# Patient Record
Sex: MALE | Race: WHITE | Employment: UNEMPLOYED | URBAN - METROPOLITAN AREA
[De-identification: names, ages, dates, MRNs, and addresses within clinical notes are randomized per-mention and may not be internally consistent; named-entity substitution may affect disease eponyms.]

---

## 2021-11-01 ENCOUNTER — HOSPITAL ENCOUNTER (EMERGENCY)
Age: 41
Discharge: HOME | End: 2021-11-01
Attending: EMERGENCY MEDICINE
Payer: COMMERCIAL

## 2021-11-01 ENCOUNTER — APPOINTMENT (OUTPATIENT)
Dept: GENERAL RADIOLOGY | Age: 41
End: 2021-11-01
Payer: COMMERCIAL

## 2021-11-01 VITALS
DIASTOLIC BLOOD PRESSURE: 121 MMHG | OXYGEN SATURATION: 99 % | WEIGHT: 314 LBS | SYSTOLIC BLOOD PRESSURE: 185 MMHG | TEMPERATURE: 98.5 F | RESPIRATION RATE: 16 BRPM | HEART RATE: 90 BPM

## 2021-11-01 DIAGNOSIS — K92.2 GASTROINTESTINAL HEMORRHAGE, UNSPECIFIED GASTROINTESTINAL HEMORRHAGE TYPE: Primary | ICD-10-CM

## 2021-11-01 DIAGNOSIS — R03.0 ELEVATED BLOOD PRESSURE READING: ICD-10-CM

## 2021-11-01 DIAGNOSIS — E11.65 TYPE 2 DIABETES MELLITUS WITH HYPERGLYCEMIA, WITHOUT LONG-TERM CURRENT USE OF INSULIN (HCC): ICD-10-CM

## 2021-11-01 LAB
A/G RATIO: 1.4 (ref 1.1–2.2)
ABO/RH: NORMAL
ALBUMIN SERPL-MCNC: 4.5 G/DL (ref 3.4–5)
ALP BLD-CCNC: 146 U/L (ref 40–129)
ALT SERPL-CCNC: 38 U/L (ref 10–40)
ANION GAP SERPL CALCULATED.3IONS-SCNC: 13 MMOL/L (ref 3–16)
ANTIBODY SCREEN: NORMAL
AST SERPL-CCNC: 32 U/L (ref 15–37)
BASOPHILS ABSOLUTE: 0.1 K/UL (ref 0–0.2)
BASOPHILS RELATIVE PERCENT: 1.4 %
BILIRUB SERPL-MCNC: 0.8 MG/DL (ref 0–1)
BUN BLDV-MCNC: 13 MG/DL (ref 7–20)
CALCIUM SERPL-MCNC: 9.5 MG/DL (ref 8.3–10.6)
CHLORIDE BLD-SCNC: 95 MMOL/L (ref 99–110)
CO2: 33 MMOL/L (ref 21–32)
CREAT SERPL-MCNC: 1 MG/DL (ref 0.9–1.3)
EOSINOPHILS ABSOLUTE: 0.3 K/UL (ref 0–0.6)
EOSINOPHILS RELATIVE PERCENT: 2.7 %
GFR AFRICAN AMERICAN: >60
GFR NON-AFRICAN AMERICAN: >60
GLUCOSE BLD-MCNC: 262 MG/DL (ref 70–99)
HCT VFR BLD CALC: 41.3 % (ref 40.5–52.5)
HEMOGLOBIN: 14.7 G/DL (ref 13.5–17.5)
LIPASE: 57 U/L (ref 13–60)
LYMPHOCYTES ABSOLUTE: 1.5 K/UL (ref 1–5.1)
LYMPHOCYTES RELATIVE PERCENT: 14.7 %
MCH RBC QN AUTO: 28.7 PG (ref 26–34)
MCHC RBC AUTO-ENTMCNC: 35.6 G/DL (ref 31–36)
MCV RBC AUTO: 80.6 FL (ref 80–100)
MONOCYTES ABSOLUTE: 0.7 K/UL (ref 0–1.3)
MONOCYTES RELATIVE PERCENT: 6.6 %
NEUTROPHILS ABSOLUTE: 7.8 K/UL (ref 1.7–7.7)
NEUTROPHILS RELATIVE PERCENT: 74.6 %
OCCULT BLOOD DIAGNOSTIC: ABNORMAL
PDW BLD-RTO: 14 % (ref 12.4–15.4)
PLATELET # BLD: 195 K/UL (ref 135–450)
PMV BLD AUTO: 8.4 FL (ref 5–10.5)
POTASSIUM SERPL-SCNC: 3.3 MMOL/L (ref 3.5–5.1)
PRO-BNP: 100 PG/ML (ref 0–124)
RBC # BLD: 5.13 M/UL (ref 4.2–5.9)
SODIUM BLD-SCNC: 141 MMOL/L (ref 136–145)
TOTAL PROTEIN: 7.8 G/DL (ref 6.4–8.2)
TROPONIN: <0.01 NG/ML
WBC # BLD: 10.4 K/UL (ref 4–11)

## 2021-11-01 PROCEDURE — 80053 COMPREHEN METABOLIC PANEL: CPT

## 2021-11-01 PROCEDURE — 99283 EMERGENCY DEPT VISIT LOW MDM: CPT

## 2021-11-01 PROCEDURE — 86850 RBC ANTIBODY SCREEN: CPT

## 2021-11-01 PROCEDURE — 83880 ASSAY OF NATRIURETIC PEPTIDE: CPT

## 2021-11-01 PROCEDURE — 83690 ASSAY OF LIPASE: CPT

## 2021-11-01 PROCEDURE — 93005 ELECTROCARDIOGRAM TRACING: CPT | Performed by: EMERGENCY MEDICINE

## 2021-11-01 PROCEDURE — 84484 ASSAY OF TROPONIN QUANT: CPT

## 2021-11-01 PROCEDURE — 86901 BLOOD TYPING SEROLOGIC RH(D): CPT

## 2021-11-01 PROCEDURE — 85025 COMPLETE CBC W/AUTO DIFF WBC: CPT

## 2021-11-01 PROCEDURE — 71045 X-RAY EXAM CHEST 1 VIEW: CPT

## 2021-11-01 PROCEDURE — 73030 X-RAY EXAM OF SHOULDER: CPT

## 2021-11-01 PROCEDURE — 36415 COLL VENOUS BLD VENIPUNCTURE: CPT

## 2021-11-01 PROCEDURE — 82270 OCCULT BLOOD FECES: CPT

## 2021-11-01 PROCEDURE — 86900 BLOOD TYPING SEROLOGIC ABO: CPT

## 2021-11-01 RX ORDER — ASPIRIN 81 MG/1
81 TABLET, CHEWABLE ORAL DAILY
COMMUNITY

## 2021-11-01 RX ORDER — LISINOPRIL AND HYDROCHLOROTHIAZIDE 25; 20 MG/1; MG/1
1 TABLET ORAL DAILY
Qty: 30 TABLET | Refills: 0 | Status: SHIPPED | OUTPATIENT
Start: 2021-11-01

## 2021-11-01 ASSESSMENT — ENCOUNTER SYMPTOMS
VOMITING: 0
DIARRHEA: 0
CHEST TIGHTNESS: 0
NAUSEA: 0
ABDOMINAL PAIN: 0
BLOOD IN STOOL: 1
SHORTNESS OF BREATH: 0

## 2021-11-01 NOTE — ED PROVIDER NOTES
[facility]        Pt Name: Laura Steele  MRN: [id number]  [place] 1980  Date of evaluation: 11/1/2021  Provider: KIMBER Morrell CNP  PCP: No primary care provider on file. Note Started: 3:10 PM EDT        I have seen and evaluated this patient with my supervising physician Geena Christianson MD.    [address] [place] [address]       Chief Complaint   Patient presents with    Rectal Bleeding     Patient in with complaints of dark red rectal bleeding that started around 0100. Denies abd pain at this time. States months ago he recalls having abd pain but none at this time        HISTORY OF PRESENT ILLNESS   (Location, Timing/Onset, Context/Setting, Quality, Duration, Modifying Factors, Severity, Associated Signs and Symptoms)  Note limiting factors. Chief Complaint: multiple complaints     Laura Steele is a 41 y.o. male who presents to the emergency department with complaint of rectal bleeding. Onset of symptoms at about 1:30 AM and second episode occurring at 1:30 PM.  The patient reports that rectal discharge is clear mixed with clots. No anal pain or concern/history of hemorrhoids. No abdominal pain. Patient denies history of rectal bleeding or GI bleeding. States that he has been taking aspirin for a left shoulder injury as well as some naproxen/ibuprofen but feels like he is not taking too much, reports he is trying to avoid this medication. Also noted to have high blood pressure today with blood pressure of 215/124, states that he is not taking any blood pressure medication for several months as he did not have a good relationship with his doctor and he moved to the [facility] from [place], has not established primary care. Denies chest pain or shortness of breath. Denies any headache, fever, lightheadedness, dizziness, visual disturbances. No chest pain or pressure. No neck or back pain.   No shortness of breath, cough, or congestion. No abdominal pain, nausea, vomiting, diarrhea, constipation, or dysuria. No rash. Nursing Notes were all reviewed and agreed with or any disagreements were addressed in the HPI. REVIEW OF SYSTEMS    (2-9 systems for level 4, 10 or more for level 5)     Review of Systems   Constitutional: Negative for activity change, chills and fever. Respiratory: Negative for chest tightness and shortness of breath. Cardiovascular: Negative for chest pain. High blood pressure   Gastrointestinal: Positive for blood in stool. Negative for abdominal pain, diarrhea, nausea and vomiting. Genitourinary: Negative for dysuria. Musculoskeletal:        Left shoulder injury/pain   All other systems reviewed and are negative. Positives and Pertinent negatives as per HPI. Except as noted above in the ROS, all other systems were reviewed and negative. PAST MEDICAL HISTORY   No past medical history on file. SURGICAL HISTORY   No past surgical history on file. CURRENTMEDICATIONS       Previous Medications    ASPIRIN 81 MG CHEWABLE TABLET    Take 81 mg by mouth daily         ALLERGIES     Patient has no known allergies. FAMILYHISTORY     No family history on file. SOCIAL HISTORY       Social History     Tobacco Use    Smoking status: Never Smoker    Smokeless tobacco: Never Used   Substance Use Topics    Alcohol use: Not Currently    Drug use: Never       SCREENINGS             PHYSICAL EXAM    (up to 7 for level 4, 8 or more for level 5)     ED Triage Vitals [11/01/21 1441]   BP Temp Temp Source Pulse Resp SpO2 Height Weight   (!) 215/124 99.4 °F (37.4 °C) Oral 102 20 95 % -- (!) 314 lb (142.4 kg)       Physical Exam  Vitals and nursing note reviewed. Constitutional:       Appearance: He is well-developed. He is not diaphoretic. HENT:      Head: Normocephalic and atraumatic.       Right Ear: External ear normal.      Left Ear: External ear normal.   Eyes: General:         Right eye: No discharge. Left eye: No discharge. Neck:      Vascular: No JVD. Cardiovascular:      Rate and Rhythm: Tachycardia present. Pulses: Normal pulses. Heart sounds: Normal heart sounds. Pulmonary:      Effort: Pulmonary effort is normal. No respiratory distress. Breath sounds: Normal breath sounds. Abdominal:      Palpations: Abdomen is soft. Tenderness: There is no abdominal tenderness. Musculoskeletal:         General: Normal range of motion. Cervical back: Normal range of motion and neck supple. Skin:     General: Skin is warm and dry. Coloration: Skin is not pale. Neurological:      Mental Status: He is alert and oriented to person, place, and time.    Psychiatric:         Behavior: Behavior normal.     rectal exam reveals bright red blood per rectum    DIAGNOSTIC RESULTS   LABS:    Labs Reviewed   CBC WITH AUTO DIFFERENTIAL - Abnormal; Notable for the following components:       Result Value    Neutrophils Absolute 7.8 (*)     All other components within normal limits    Narrative:     Performed at:  [facility] [address] [place]   Phone [phone]   COMPREHENSIVE METABOLIC PANEL - Abnormal; Notable for the following components:    Potassium 3.3 (*)     Chloride 95 (*)     CO2 33 (*)     Glucose 262 (*)     Alkaline Phosphatase 146 (*)     All other components within normal limits    Narrative:     Performed at:  [facility]  [address],  [place], [address]   Phone [phone]   LIPASE    Narrative:     Performed at:  [facility] [address],  [place], [address]   Phone [phone]   TROPONIN    Narrative:     Performed at:  [facility] [address],  [place], [address]   Phone [phone]   BRAIN NATRIURETIC PEPTIDE    Narrative:     Performed at:  [facility]  [address] [place], [place]   Phone [phone]   BLOOD OCCULT STOOL DIAGNOSTIC   TYPE AND SCREEN    Narrative:     Performed at:  [facility]  [address] [place], [address]   Phone [phone]       When ordered only abnormal lab results are displayed. All other labs were within normal range or not returned as of this dictation. EKG: When ordered, EKG's are interpreted by the Emergency Department Physician in the absence of a cardiologist.  Please see their note for interpretation of EKG. RADIOLOGY:   Non-plain film images such as CT, Ultrasound and MRI are read by the radiologist. Plain radiographic images are visualized and preliminarily interpreted by the ED Provider with the below findings:        Interpretation per the Radiologist below, if available at the time of this note:    XR CHEST PORTABLE   Final Result   No active cardiopulmonary disease         XR SHOULDER LEFT (MIN 2 VIEWS)   Final Result   No acute abnormality           No results found. PROCEDURES   Unless otherwise noted below, none     Procedures    CRITICAL CARE TIME   N/A    CONSULTS:  None      EMERGENCY DEPARTMENT COURSE and DIFFERENTIAL DIAGNOSIS/MDM:   Vitals:    Vitals:    11/01/21 1441 11/01/21 1603   BP: (!) 215/124 (!) 209/112   Pulse: 102 92   Resp: 20 16   Temp: 99.4 °F (37.4 °C)    TempSrc: Oral    SpO2: 95%    Weight: (!) 314 lb (142.4 kg)        Patient was given the following medications:  Medications - No data to display        Briefly, this is a 42-year-old male who presents to the emergency department today with multiple complaints. It is noted that he is extremely elevated blood pressure with a pressure of 215/124. States that he is noncompliant with medications and no longer takes his blood pressure medication. States that he moved to the [facility] from [place] and has not established primary care.   He has no idea of medication that he used to be on, states that he does not recall. He is also complaining of pain to the left shoulder, injury greater than 10weeks old. Also complaining of    Passing clots the rectum since overnight. CBC is unremarkable. CMP does show a hypokalemia potassium 3.3 and diabetes with a glucose of 262. Patient reports that he does note that he is a diabetic and he is managing his diabetes with \"self-awareness and dietary changes\". I will start this patient on Metformin today and instruct him on dietary changes that may lead to some weight loss. He also needs to follow-up with primary care. Lipase is normal.  Troponin negative. . XR CHEST PORTABLE (Final result)  Result time 11/01/21 15:51:49  Final result by Sravani Buck MD (11/01/21 15:51:49)                Impression:    No active cardiopulmonary disease               XR SHOULDER LEFT (MIN 2 VIEWS) (Final result)  Result time 11/01/21 15:34:24  Final result by Sravani Buck MD (11/01/21 15:34:24)                Impression:    No acute abnormality               Follow-up with primary care, GI referral provided. Patient is instructed on strict return precautions close outpatient follow-up. He does verbalize understanding    We will start him on lisinopril with hydrochlorothiazide for his blood pressure. Instructed to return for chest pain or shortness of breath. FINAL IMPRESSION      1. Gastrointestinal hemorrhage, unspecified gastrointestinal hemorrhage type    2. Elevated blood pressure reading    3.  Type 2 diabetes mellitus with hyperglycemia, without long-term current use of insulin [facility])          DISPOSITION/PLAN   DISPOSITION Decision To Discharge 11/01/2021 04:56:54 PM      PATIENT REFERRED TO:  Milad Morales  [phone]  Schedule an appointment as soon as possible for a visit       Robert Persaud MD  [address]  [place]  [phone]    Schedule an appointment

## 2021-11-01 NOTE — ED PROVIDER NOTES
I independently performed a history and physical on Jacinda Leal. All diagnostic, treatment, and disposition decisions were made by myself in conjunction with the advanced practice provider. Briefly, this is a 41 y.o. male here for 3 separate complaints. #1 is of left shoulder pain, #2 is hypertension, and #3 rectal bleeding. 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 No tingling numbness weeks paresthesia no fevers chills sweats but  On examination he has a full range of motion, is neurovascular intact there is no signs of impingement and minimal tenderness to palpation left shoulder joint. #2) As to the hypertension has history hypertension has not been on medicines for at least a few months he is not sure what he is to take but he feels he is hypertensive now. No chest pain or dyspnea. No nausea or diarrhea. Heart rhythm regular lungs are clear. Grade 1 AV nicking on funduscopy. #3) rectal bleeding states 1 days had a few episodes of bright red blood per rectum. No abdominal pain no fevers chills sweats no history of prior GI bleeds, no anticoagulation. .          EKG  EKG is reviewed by myself. Dated today 1540. Rate 91 sinus rhythm LVH pattern. No prior      Screenings                   MDM  #1, left shoulder. Full  Range of motion no indication for imaging. 2.  Hypertension and is to start on thiazides probably in combination with ACE inhibitor and be referred to cardiology. We will check labs and EKG. 3.  GI bleeding abdomen is benign most likely hemorrhoidal versus fissure. Will do examination. Check labs. If benign can get referred to GI for scope. 4) Noted to be hyperglycemic on labs; Start metformin, refer to PCP.     Patient Referrals:  Milad Morales  [phone]  Schedule an appointment as soon as possible for a visit       Claudeen Gros Marilynn Lemons, [address] [facility]  [phone]    Schedule an appointment as soon as possible for a visit         Discharge Medications:  New Prescriptions    LISINOPRIL-HYDROCHLOROTHIAZIDE (PRINZIDE;ZESTORETIC) 20-25 MG PER TABLET    Take 1 tablet by mouth daily    METFORMIN (GLUCOPHAGE) 500 MG TABLET    Take 1 tablet by mouth 2 times daily (with meals)       FINAL IMPRESSION  1. Gastrointestinal hemorrhage, unspecified gastrointestinal hemorrhage type    2. Elevated blood pressure reading    3. Type 2 diabetes mellitus with hyperglycemia, without long-term current use of insulin (HCC)        Blood pressure (!) 209/112, pulse 92, temperature 99.4 °F (37.4 °C), temperature source Oral, resp. rate 16, weight (!) 314 lb (142.4 kg), SpO2 95 %. For further details of [facility] emergency department encounter, please see documentation by advanced practice provider, Shaina Lopes.        Janeth Naidu MD  11/01/21 MD Brenden  11/01/21 3097

## 2021-11-02 LAB
EKG ATRIAL RATE: 91 BPM
EKG DIAGNOSIS: NORMAL
EKG P AXIS: 16 DEGREES
EKG P-R INTERVAL: 154 MS
EKG Q-T INTERVAL: 414 MS
EKG QRS DURATION: 94 MS
EKG QTC CALCULATION (BAZETT): 509 MS
EKG R AXIS: -28 DEGREES
EKG T AXIS: 31 DEGREES
EKG VENTRICULAR RATE: 91 BPM

## 2021-11-02 PROCEDURE — 93010 ELECTROCARDIOGRAM REPORT: CPT | Performed by: INTERNAL MEDICINE

## 2022-12-04 ENCOUNTER — APPOINTMENT (OUTPATIENT)
Dept: CT IMAGING | Age: 42
End: 2022-12-04
Payer: MEDICAID

## 2022-12-04 ENCOUNTER — HOSPITAL ENCOUNTER (INPATIENT)
Age: 42
LOS: 4 days | Discharge: LEFT BEFORE DISCHARGE | End: 2022-12-08
Attending: STUDENT IN AN ORGANIZED HEALTH CARE EDUCATION/TRAINING PROGRAM | Admitting: HOSPITALIST
Payer: MEDICAID

## 2022-12-04 DIAGNOSIS — I16.1 HYPERTENSIVE EMERGENCY: ICD-10-CM

## 2022-12-04 DIAGNOSIS — R04.2 HEMOPTYSIS: Primary | ICD-10-CM

## 2022-12-04 LAB
A/G RATIO: 1.5 (ref 1.1–2.2)
ALBUMIN SERPL-MCNC: 4.3 G/DL (ref 3.4–5)
ALP BLD-CCNC: 91 U/L (ref 40–129)
ALT SERPL-CCNC: 17 U/L (ref 10–40)
ANION GAP SERPL CALCULATED.3IONS-SCNC: 10 MMOL/L (ref 3–16)
AST SERPL-CCNC: 22 U/L (ref 15–37)
BASOPHILS ABSOLUTE: 0.1 K/UL (ref 0–0.2)
BASOPHILS RELATIVE PERCENT: 1.1 %
BILIRUB SERPL-MCNC: 0.6 MG/DL (ref 0–1)
BUN BLDV-MCNC: 20 MG/DL (ref 7–20)
CALCIUM SERPL-MCNC: 8.7 MG/DL (ref 8.3–10.6)
CHLORIDE BLD-SCNC: 103 MMOL/L (ref 99–110)
CO2: 27 MMOL/L (ref 21–32)
CREAT SERPL-MCNC: 0.9 MG/DL (ref 0.9–1.3)
EOSINOPHILS ABSOLUTE: 0.2 K/UL (ref 0–0.6)
EOSINOPHILS RELATIVE PERCENT: 2.1 %
GFR SERPL CREATININE-BSD FRML MDRD: >60 ML/MIN/{1.73_M2}
GLUCOSE BLD-MCNC: 113 MG/DL (ref 70–99)
HCT VFR BLD CALC: 42.9 % (ref 40.5–52.5)
HEMOGLOBIN: 15 G/DL (ref 13.5–17.5)
INFLUENZA A: NOT DETECTED
INFLUENZA B: NOT DETECTED
LYMPHOCYTES ABSOLUTE: 1.8 K/UL (ref 1–5.1)
LYMPHOCYTES RELATIVE PERCENT: 20.6 %
MAGNESIUM: 1.8 MG/DL (ref 1.8–2.4)
MCH RBC QN AUTO: 28.4 PG (ref 26–34)
MCHC RBC AUTO-ENTMCNC: 34.9 G/DL (ref 31–36)
MCV RBC AUTO: 81.3 FL (ref 80–100)
MONOCYTES ABSOLUTE: 0.7 K/UL (ref 0–1.3)
MONOCYTES RELATIVE PERCENT: 7.9 %
NEUTROPHILS ABSOLUTE: 6 K/UL (ref 1.7–7.7)
NEUTROPHILS RELATIVE PERCENT: 68.3 %
PDW BLD-RTO: 14.2 % (ref 12.4–15.4)
PLATELET # BLD: 220 K/UL (ref 135–450)
PMV BLD AUTO: 8.4 FL (ref 5–10.5)
POTASSIUM REFLEX MAGNESIUM: 3.3 MMOL/L (ref 3.5–5.1)
PRO-BNP: 202 PG/ML (ref 0–124)
RBC # BLD: 5.27 M/UL (ref 4.2–5.9)
SARS-COV-2 RNA, RT PCR: NOT DETECTED
SODIUM BLD-SCNC: 140 MMOL/L (ref 136–145)
TOTAL PROTEIN: 7.1 G/DL (ref 6.4–8.2)
TROPONIN: <0.01 NG/ML
WBC # BLD: 8.8 K/UL (ref 4–11)

## 2022-12-04 PROCEDURE — 85025 COMPLETE CBC W/AUTO DIFF WBC: CPT

## 2022-12-04 PROCEDURE — 83036 HEMOGLOBIN GLYCOSYLATED A1C: CPT

## 2022-12-04 PROCEDURE — 84484 ASSAY OF TROPONIN QUANT: CPT

## 2022-12-04 PROCEDURE — 87636 SARSCOV2 & INF A&B AMP PRB: CPT

## 2022-12-04 PROCEDURE — 80053 COMPREHEN METABOLIC PANEL: CPT

## 2022-12-04 PROCEDURE — 83735 ASSAY OF MAGNESIUM: CPT

## 2022-12-04 PROCEDURE — 6360000002 HC RX W HCPCS: Performed by: STUDENT IN AN ORGANIZED HEALTH CARE EDUCATION/TRAINING PROGRAM

## 2022-12-04 PROCEDURE — 99285 EMERGENCY DEPT VISIT HI MDM: CPT

## 2022-12-04 PROCEDURE — 84145 PROCALCITONIN (PCT): CPT

## 2022-12-04 PROCEDURE — 2500000003 HC RX 250 WO HCPCS: Performed by: STUDENT IN AN ORGANIZED HEALTH CARE EDUCATION/TRAINING PROGRAM

## 2022-12-04 PROCEDURE — 36415 COLL VENOUS BLD VENIPUNCTURE: CPT

## 2022-12-04 PROCEDURE — 71260 CT THORAX DX C+: CPT | Performed by: STUDENT IN AN ORGANIZED HEALTH CARE EDUCATION/TRAINING PROGRAM

## 2022-12-04 PROCEDURE — 96375 TX/PRO/DX INJ NEW DRUG ADDON: CPT

## 2022-12-04 PROCEDURE — 6360000004 HC RX CONTRAST MEDICATION: Performed by: STUDENT IN AN ORGANIZED HEALTH CARE EDUCATION/TRAINING PROGRAM

## 2022-12-04 PROCEDURE — 2060000000 HC ICU INTERMEDIATE R&B

## 2022-12-04 PROCEDURE — 2580000003 HC RX 258: Performed by: STUDENT IN AN ORGANIZED HEALTH CARE EDUCATION/TRAINING PROGRAM

## 2022-12-04 PROCEDURE — 96374 THER/PROPH/DIAG INJ IV PUSH: CPT

## 2022-12-04 PROCEDURE — 70450 CT HEAD/BRAIN W/O DYE: CPT

## 2022-12-04 PROCEDURE — 83880 ASSAY OF NATRIURETIC PEPTIDE: CPT

## 2022-12-04 RX ORDER — INSULIN LISPRO 100 [IU]/ML
0-4 INJECTION, SOLUTION INTRAVENOUS; SUBCUTANEOUS NIGHTLY
Status: DISCONTINUED | OUTPATIENT
Start: 2022-12-04 | End: 2022-12-07

## 2022-12-04 RX ORDER — LABETALOL HYDROCHLORIDE 5 MG/ML
10 INJECTION, SOLUTION INTRAVENOUS ONCE
Status: COMPLETED | OUTPATIENT
Start: 2022-12-04 | End: 2022-12-04

## 2022-12-04 RX ORDER — ONDANSETRON 4 MG/1
4 TABLET, ORALLY DISINTEGRATING ORAL EVERY 8 HOURS PRN
Status: DISCONTINUED | OUTPATIENT
Start: 2022-12-04 | End: 2022-12-08 | Stop reason: HOSPADM

## 2022-12-04 RX ORDER — ASPIRIN 81 MG/1
81 TABLET, CHEWABLE ORAL DAILY
Status: DISCONTINUED | OUTPATIENT
Start: 2022-12-05 | End: 2022-12-08 | Stop reason: HOSPADM

## 2022-12-04 RX ORDER — LISINOPRIL AND HYDROCHLOROTHIAZIDE 25; 20 MG/1; MG/1
1 TABLET ORAL DAILY
Status: DISCONTINUED | OUTPATIENT
Start: 2022-12-05 | End: 2022-12-04

## 2022-12-04 RX ORDER — HYDRALAZINE HYDROCHLORIDE 20 MG/ML
5 INJECTION INTRAMUSCULAR; INTRAVENOUS ONCE
Status: COMPLETED | OUTPATIENT
Start: 2022-12-04 | End: 2022-12-04

## 2022-12-04 RX ORDER — POLYETHYLENE GLYCOL 3350 17 G/17G
17 POWDER, FOR SOLUTION ORAL DAILY PRN
Status: DISCONTINUED | OUTPATIENT
Start: 2022-12-04 | End: 2022-12-08 | Stop reason: HOSPADM

## 2022-12-04 RX ORDER — ACETAMINOPHEN 650 MG/1
650 SUPPOSITORY RECTAL EVERY 6 HOURS PRN
Status: DISCONTINUED | OUTPATIENT
Start: 2022-12-04 | End: 2022-12-08 | Stop reason: HOSPADM

## 2022-12-04 RX ORDER — SODIUM CHLORIDE 0.9 % (FLUSH) 0.9 %
5-40 SYRINGE (ML) INJECTION EVERY 12 HOURS SCHEDULED
Status: DISCONTINUED | OUTPATIENT
Start: 2022-12-04 | End: 2022-12-08 | Stop reason: HOSPADM

## 2022-12-04 RX ORDER — SODIUM CHLORIDE 9 MG/ML
INJECTION, SOLUTION INTRAVENOUS PRN
Status: DISCONTINUED | OUTPATIENT
Start: 2022-12-04 | End: 2022-12-08 | Stop reason: HOSPADM

## 2022-12-04 RX ORDER — ACETAMINOPHEN 325 MG/1
650 TABLET ORAL EVERY 6 HOURS PRN
Status: DISCONTINUED | OUTPATIENT
Start: 2022-12-04 | End: 2022-12-08 | Stop reason: HOSPADM

## 2022-12-04 RX ORDER — HYDRALAZINE HYDROCHLORIDE 20 MG/ML
10 INJECTION INTRAMUSCULAR; INTRAVENOUS EVERY 6 HOURS PRN
Status: DISCONTINUED | OUTPATIENT
Start: 2022-12-04 | End: 2022-12-08

## 2022-12-04 RX ORDER — SODIUM CHLORIDE 0.9 % (FLUSH) 0.9 %
5-40 SYRINGE (ML) INJECTION PRN
Status: DISCONTINUED | OUTPATIENT
Start: 2022-12-04 | End: 2022-12-08 | Stop reason: HOSPADM

## 2022-12-04 RX ORDER — HYDROCHLOROTHIAZIDE 25 MG/1
25 TABLET ORAL DAILY
Status: DISCONTINUED | OUTPATIENT
Start: 2022-12-05 | End: 2022-12-05

## 2022-12-04 RX ORDER — INSULIN LISPRO 100 [IU]/ML
0-4 INJECTION, SOLUTION INTRAVENOUS; SUBCUTANEOUS
Status: DISCONTINUED | OUTPATIENT
Start: 2022-12-05 | End: 2022-12-07

## 2022-12-04 RX ORDER — LISINOPRIL 20 MG/1
20 TABLET ORAL DAILY
Status: DISCONTINUED | OUTPATIENT
Start: 2022-12-05 | End: 2022-12-05

## 2022-12-04 RX ORDER — ONDANSETRON 2 MG/ML
4 INJECTION INTRAMUSCULAR; INTRAVENOUS EVERY 6 HOURS PRN
Status: DISCONTINUED | OUTPATIENT
Start: 2022-12-04 | End: 2022-12-08 | Stop reason: HOSPADM

## 2022-12-04 RX ORDER — DEXTROSE MONOHYDRATE 100 MG/ML
INJECTION, SOLUTION INTRAVENOUS CONTINUOUS PRN
Status: DISCONTINUED | OUTPATIENT
Start: 2022-12-04 | End: 2022-12-08 | Stop reason: HOSPADM

## 2022-12-04 RX ADMIN — CEFTRIAXONE SODIUM 1000 MG: 1 INJECTION, POWDER, FOR SOLUTION INTRAMUSCULAR; INTRAVENOUS at 23:20

## 2022-12-04 RX ADMIN — IOPAMIDOL 85 ML: 755 INJECTION, SOLUTION INTRAVENOUS at 22:04

## 2022-12-04 RX ADMIN — HYDRALAZINE HYDROCHLORIDE 5 MG: 20 INJECTION INTRAMUSCULAR; INTRAVENOUS at 21:16

## 2022-12-04 RX ADMIN — LABETALOL HYDROCHLORIDE 10 MG: 5 INJECTION, SOLUTION INTRAVENOUS at 22:45

## 2022-12-04 ASSESSMENT — LIFESTYLE VARIABLES
HOW OFTEN DO YOU HAVE A DRINK CONTAINING ALCOHOL: NEVER
HOW MANY STANDARD DRINKS CONTAINING ALCOHOL DO YOU HAVE ON A TYPICAL DAY: PATIENT DOES NOT DRINK

## 2022-12-04 ASSESSMENT — PAIN - FUNCTIONAL ASSESSMENT: PAIN_FUNCTIONAL_ASSESSMENT: 0-10

## 2022-12-04 ASSESSMENT — PAIN SCALES - GENERAL: PAINLEVEL_OUTOF10: 0

## 2022-12-05 PROBLEM — R07.9 CHEST PAIN: Status: ACTIVE | Noted: 2022-12-05

## 2022-12-05 PROBLEM — J81.0 ACUTE PULMONARY EDEMA (HCC): Status: ACTIVE | Noted: 2022-12-05

## 2022-12-05 PROBLEM — I16.1 HYPERTENSIVE EMERGENCY: Status: ACTIVE | Noted: 2022-12-05

## 2022-12-05 LAB
ALBUMIN SERPL-MCNC: 4.4 G/DL (ref 3.4–5)
ANION GAP SERPL CALCULATED.3IONS-SCNC: 10 MMOL/L (ref 3–16)
ANION GAP SERPL CALCULATED.3IONS-SCNC: 9 MMOL/L (ref 3–16)
BASOPHILS ABSOLUTE: 0.1 K/UL (ref 0–0.2)
BASOPHILS RELATIVE PERCENT: 1.2 %
BUN BLDV-MCNC: 15 MG/DL (ref 7–20)
BUN BLDV-MCNC: 15 MG/DL (ref 7–20)
C-REACTIVE PROTEIN: 5.8 MG/L (ref 0–5.1)
CALCIUM SERPL-MCNC: 9.1 MG/DL (ref 8.3–10.6)
CALCIUM SERPL-MCNC: 9.7 MG/DL (ref 8.3–10.6)
CHLORIDE BLD-SCNC: 100 MMOL/L (ref 99–110)
CHLORIDE BLD-SCNC: 101 MMOL/L (ref 99–110)
CHOLESTEROL, TOTAL: 183 MG/DL (ref 0–199)
CO2: 28 MMOL/L (ref 21–32)
CO2: 29 MMOL/L (ref 21–32)
CREAT SERPL-MCNC: 0.9 MG/DL (ref 0.9–1.3)
CREAT SERPL-MCNC: 1 MG/DL (ref 0.9–1.3)
EOSINOPHILS ABSOLUTE: 0.2 K/UL (ref 0–0.6)
EOSINOPHILS RELATIVE PERCENT: 2.1 %
ESTIMATED AVERAGE GLUCOSE: 114 MG/DL
GFR SERPL CREATININE-BSD FRML MDRD: >60 ML/MIN/{1.73_M2}
GFR SERPL CREATININE-BSD FRML MDRD: >60 ML/MIN/{1.73_M2}
GLUCOSE BLD-MCNC: 113 MG/DL (ref 70–99)
GLUCOSE BLD-MCNC: 115 MG/DL (ref 70–99)
GLUCOSE BLD-MCNC: 119 MG/DL (ref 70–99)
GLUCOSE BLD-MCNC: 168 MG/DL (ref 70–99)
GLUCOSE BLD-MCNC: 201 MG/DL (ref 70–99)
HBA1C MFR BLD: 5.6 %
HCT VFR BLD CALC: 42.9 % (ref 40.5–52.5)
HDLC SERPL-MCNC: 29 MG/DL (ref 40–60)
HEMOGLOBIN: 14.8 G/DL (ref 13.5–17.5)
LDL CHOLESTEROL CALCULATED: 123 MG/DL
LYMPHOCYTES ABSOLUTE: 1.3 K/UL (ref 1–5.1)
LYMPHOCYTES RELATIVE PERCENT: 17.8 %
MAGNESIUM: 1.8 MG/DL (ref 1.8–2.4)
MCH RBC QN AUTO: 28.2 PG (ref 26–34)
MCHC RBC AUTO-ENTMCNC: 34.5 G/DL (ref 31–36)
MCV RBC AUTO: 81.6 FL (ref 80–100)
MONOCYTES ABSOLUTE: 0.5 K/UL (ref 0–1.3)
MONOCYTES RELATIVE PERCENT: 7.5 %
NEUTROPHILS ABSOLUTE: 5.2 K/UL (ref 1.7–7.7)
NEUTROPHILS RELATIVE PERCENT: 71.4 %
PDW BLD-RTO: 14.2 % (ref 12.4–15.4)
PERFORMED ON: ABNORMAL
PHOSPHORUS: 2.6 MG/DL (ref 2.5–4.9)
PLATELET # BLD: 210 K/UL (ref 135–450)
PMV BLD AUTO: 9.2 FL (ref 5–10.5)
POTASSIUM REFLEX MAGNESIUM: 2.8 MMOL/L (ref 3.5–5.1)
POTASSIUM SERPL-SCNC: 3.3 MMOL/L (ref 3.5–5.1)
PROCALCITONIN: 0.09 NG/ML (ref 0–0.15)
RBC # BLD: 5.26 M/UL (ref 4.2–5.9)
SODIUM BLD-SCNC: 137 MMOL/L (ref 136–145)
SODIUM BLD-SCNC: 140 MMOL/L (ref 136–145)
TRIGL SERPL-MCNC: 157 MG/DL (ref 0–150)
TROPONIN: <0.01 NG/ML
TROPONIN: <0.01 NG/ML
VLDLC SERPL CALC-MCNC: 31 MG/DL
WBC # BLD: 7.3 K/UL (ref 4–11)

## 2022-12-05 PROCEDURE — 99291 CRITICAL CARE FIRST HOUR: CPT | Performed by: INTERNAL MEDICINE

## 2022-12-05 PROCEDURE — 2500000003 HC RX 250 WO HCPCS: Performed by: HOSPITALIST

## 2022-12-05 PROCEDURE — 6360000002 HC RX W HCPCS: Performed by: HOSPITALIST

## 2022-12-05 PROCEDURE — 83735 ASSAY OF MAGNESIUM: CPT

## 2022-12-05 PROCEDURE — 84484 ASSAY OF TROPONIN QUANT: CPT

## 2022-12-05 PROCEDURE — 2580000003 HC RX 258: Performed by: INTERNAL MEDICINE

## 2022-12-05 PROCEDURE — 80061 LIPID PANEL: CPT

## 2022-12-05 PROCEDURE — 6360000002 HC RX W HCPCS: Performed by: INTERNAL MEDICINE

## 2022-12-05 PROCEDURE — 2580000003 HC RX 258: Performed by: HOSPITALIST

## 2022-12-05 PROCEDURE — 36415 COLL VENOUS BLD VENIPUNCTURE: CPT

## 2022-12-05 PROCEDURE — 85025 COMPLETE CBC W/AUTO DIFF WBC: CPT

## 2022-12-05 PROCEDURE — 80069 RENAL FUNCTION PANEL: CPT

## 2022-12-05 PROCEDURE — 6370000000 HC RX 637 (ALT 250 FOR IP): Performed by: HOSPITALIST

## 2022-12-05 PROCEDURE — 93306 TTE W/DOPPLER COMPLETE: CPT

## 2022-12-05 PROCEDURE — 6370000000 HC RX 637 (ALT 250 FOR IP): Performed by: INTERNAL MEDICINE

## 2022-12-05 PROCEDURE — 2500000003 HC RX 250 WO HCPCS: Performed by: INTERNAL MEDICINE

## 2022-12-05 PROCEDURE — 2000000000 HC ICU R&B

## 2022-12-05 PROCEDURE — 6360000002 HC RX W HCPCS

## 2022-12-05 PROCEDURE — 86140 C-REACTIVE PROTEIN: CPT

## 2022-12-05 PROCEDURE — 93005 ELECTROCARDIOGRAM TRACING: CPT

## 2022-12-05 RX ORDER — LABETALOL HYDROCHLORIDE 5 MG/ML
20 INJECTION, SOLUTION INTRAVENOUS EVERY 4 HOURS PRN
Status: DISCONTINUED | OUTPATIENT
Start: 2022-12-05 | End: 2022-12-08

## 2022-12-05 RX ORDER — METOPROLOL TARTRATE 50 MG/1
50 TABLET, FILM COATED ORAL 2 TIMES DAILY
Status: DISCONTINUED | OUTPATIENT
Start: 2022-12-05 | End: 2022-12-08 | Stop reason: HOSPADM

## 2022-12-05 RX ORDER — POTASSIUM CHLORIDE 7.45 MG/ML
10 INJECTION INTRAVENOUS PRN
Status: DISCONTINUED | OUTPATIENT
Start: 2022-12-05 | End: 2022-12-05 | Stop reason: DRUGHIGH

## 2022-12-05 RX ORDER — MAGNESIUM SULFATE IN WATER 40 MG/ML
2000 INJECTION, SOLUTION INTRAVENOUS PRN
Status: DISCONTINUED | OUTPATIENT
Start: 2022-12-05 | End: 2022-12-08 | Stop reason: HOSPADM

## 2022-12-05 RX ORDER — POTASSIUM CHLORIDE 29.8 MG/ML
20 INJECTION INTRAVENOUS PRN
Status: DISCONTINUED | OUTPATIENT
Start: 2022-12-05 | End: 2022-12-08 | Stop reason: HOSPADM

## 2022-12-05 RX ORDER — AMLODIPINE BESYLATE 5 MG/1
10 TABLET ORAL NIGHTLY
Status: DISCONTINUED | OUTPATIENT
Start: 2022-12-05 | End: 2022-12-05

## 2022-12-05 RX ORDER — POTASSIUM CHLORIDE 7.45 MG/ML
10 INJECTION INTRAVENOUS PRN
Status: DISCONTINUED | OUTPATIENT
Start: 2022-12-05 | End: 2022-12-08 | Stop reason: HOSPADM

## 2022-12-05 RX ORDER — POTASSIUM CHLORIDE 750 MG/1
40 TABLET, EXTENDED RELEASE ORAL PRN
Status: DISCONTINUED | OUTPATIENT
Start: 2022-12-05 | End: 2022-12-05 | Stop reason: DRUGHIGH

## 2022-12-05 RX ORDER — CLONIDINE HYDROCHLORIDE 0.1 MG/1
0.1 TABLET ORAL 3 TIMES DAILY
Status: DISCONTINUED | OUTPATIENT
Start: 2022-12-05 | End: 2022-12-05

## 2022-12-05 RX ORDER — METOPROLOL TARTRATE 5 MG/5ML
2.5 INJECTION INTRAVENOUS EVERY 6 HOURS
Status: DISCONTINUED | OUTPATIENT
Start: 2022-12-05 | End: 2022-12-05

## 2022-12-05 RX ORDER — MAGNESIUM SULFATE IN WATER 40 MG/ML
2000 INJECTION, SOLUTION INTRAVENOUS ONCE
Status: COMPLETED | OUTPATIENT
Start: 2022-12-05 | End: 2022-12-05

## 2022-12-05 RX ADMIN — MAGNESIUM SULFATE HEPTAHYDRATE 2000 MG: 40 INJECTION, SOLUTION INTRAVENOUS at 20:43

## 2022-12-05 RX ADMIN — POTASSIUM CHLORIDE 10 MEQ: 7.46 INJECTION, SOLUTION INTRAVENOUS at 12:28

## 2022-12-05 RX ADMIN — METOPROLOL TARTRATE 50 MG: 50 TABLET, FILM COATED ORAL at 20:43

## 2022-12-05 RX ADMIN — DEXTROSE MONOHYDRATE 7.5 MG/HR: 50 INJECTION, SOLUTION INTRAVENOUS at 20:34

## 2022-12-05 RX ADMIN — SODIUM CHLORIDE, PRESERVATIVE FREE 10 ML: 5 INJECTION INTRAVENOUS at 08:46

## 2022-12-05 RX ADMIN — POTASSIUM CHLORIDE 10 MEQ: 7.46 INJECTION, SOLUTION INTRAVENOUS at 17:53

## 2022-12-05 RX ADMIN — SODIUM CHLORIDE, PRESERVATIVE FREE 5 ML: 5 INJECTION INTRAVENOUS at 04:23

## 2022-12-05 RX ADMIN — DEXTROSE MONOHYDRATE 12.5 MG/HR: 50 INJECTION, SOLUTION INTRAVENOUS at 22:58

## 2022-12-05 RX ADMIN — POTASSIUM CHLORIDE 10 MEQ: 7.46 INJECTION, SOLUTION INTRAVENOUS at 23:44

## 2022-12-05 RX ADMIN — HYDRALAZINE HYDROCHLORIDE 10 MG: 20 INJECTION INTRAMUSCULAR; INTRAVENOUS at 17:03

## 2022-12-05 RX ADMIN — SODIUM CHLORIDE, PRESERVATIVE FREE 10 ML: 5 INJECTION INTRAVENOUS at 20:44

## 2022-12-05 RX ADMIN — HYDRALAZINE HYDROCHLORIDE 10 MG: 20 INJECTION INTRAMUSCULAR; INTRAVENOUS at 04:01

## 2022-12-05 RX ADMIN — METOPROLOL TARTRATE 2.5 MG: 5 INJECTION, SOLUTION INTRAVENOUS at 16:08

## 2022-12-05 RX ADMIN — AZITHROMYCIN 500 MG: 500 INJECTION, POWDER, LYOPHILIZED, FOR SOLUTION INTRAVENOUS at 00:00

## 2022-12-05 RX ADMIN — POTASSIUM CHLORIDE 10 MEQ: 7.46 INJECTION, SOLUTION INTRAVENOUS at 16:57

## 2022-12-05 RX ADMIN — METOPROLOL TARTRATE 2.5 MG: 5 INJECTION, SOLUTION INTRAVENOUS at 08:46

## 2022-12-05 RX ADMIN — CEFTRIAXONE SODIUM 1000 MG: 1 INJECTION, POWDER, FOR SOLUTION INTRAMUSCULAR; INTRAVENOUS at 23:47

## 2022-12-05 RX ADMIN — POTASSIUM CHLORIDE 10 MEQ: 7.46 INJECTION, SOLUTION INTRAVENOUS at 15:31

## 2022-12-05 RX ADMIN — HYDRALAZINE HYDROCHLORIDE 10 MG: 20 INJECTION INTRAMUSCULAR; INTRAVENOUS at 09:10

## 2022-12-05 RX ADMIN — POTASSIUM CHLORIDE 10 MEQ: 7.46 INJECTION, SOLUTION INTRAVENOUS at 14:30

## 2022-12-05 RX ADMIN — DEXTROSE MONOHYDRATE 5 MG/HR: 50 INJECTION, SOLUTION INTRAVENOUS at 19:46

## 2022-12-05 RX ADMIN — ASPIRIN 81 MG: 81 TABLET, CHEWABLE ORAL at 08:46

## 2022-12-05 RX ADMIN — POTASSIUM CHLORIDE 10 MEQ: 7.46 INJECTION, SOLUTION INTRAVENOUS at 13:27

## 2022-12-05 ASSESSMENT — PAIN SCALES - GENERAL
PAINLEVEL_OUTOF10: 0
PAINLEVEL_OUTOF10: 0

## 2022-12-05 NOTE — CONSULTS
PULMONARY CONSULT NOTE  Madelin Robertson is being seen at the request of Dr. Terrie Young for a consultation for hemoptysis    HISTORY OF PRESENT ILLNESS: Madelin Robertson is a 35 y.o. male with a PMHx of noncompliance with DM & HTN medication, who presented to the ED on 11/4/2022 after 45 mins of coughing up a cup of pink foamy sputum immediately following an episode of a few minutes of band-like anterior chest tightness that spontaneously resolved, no other associated sxs. No bright red blood streaks or clots. This has happened once before in 2016 and resolved spontaneously, workup was negative at the time and pt was asymptomatic prior to both episodes. He has no history of lung disease. Poor quality CTA demonstrated no central PE. He is feeling better with treatment here but hypertension remains very poorly controlled. PAST MEDICAL HISTORY:  Past Medical History:   Diagnosis Date    ADHD     Bipolar 1 disorder ([facility] [address]     Diabetes mellitus ([facility] [address]     Hypertension      PAST SURGICAL HISTORY:  Past Surgical History:   Procedure Laterality Date    ANKLE SURGERY Left      FAMILY HISTORY: early heart disease in father    SOCIAL HISTORY:   reports that he has never smoked.  He has never used smokeless tobacco.    Scheduled Meds:   metoprolol  2.5 mg IntraVENous Q6H    cefTRIAXone (ROCEPHIN) IV  1,000 mg IntraVENous Q24H    azithromycin  500 mg IntraVENous Q24H    aspirin  81 mg Oral Daily    insulin lispro  0-4 Units SubCUTAneous TID WC    insulin lispro  0-4 Units SubCUTAneous Nightly    sodium chloride flush  5-40 mL IntraVENous 2 times per day     Continuous Infusions:   dextrose      sodium chloride       PRN Meds:  perflutren lipid microspheres, magnesium sulfate, potassium chloride **OR** potassium alternative oral replacement **OR** potassium chloride, glucose, dextrose bolus **OR** dextrose bolus, glucagon (rDNA), dextrose, hydrALAZINE, sodium chloride flush, sodium chloride, ondansetron **OR** ondansetron, polyethylene glycol, acetaminophen **OR** acetaminophen    ALLERGIES:  Patient is allergic to lisinopril-hydrochlorothiazide. REVIEW OF SYSTEMS:  Constitutional: Negative for fever  HENT: Negative for sore throat  Eyes: Negative for redness   Respiratory: - for dyspnea, + cough  Cardiovascular: + for chest pain. no TYLER  Gastrointestinal: Negative for vomiting, diarrhea   Genitourinary: Negative for hematuria   Musculoskeletal: Negative for arthralgias   Skin: Negative for rash  Neurological: Negative for syncope  Hematological: Negative for adenopathy  Psychiatric/Behavorial: Negative for anxiety    PHYSICAL EXAM:  Blood pressure (!) 178/92, pulse 71, temperature 98.2 °F (36.8 °C), temperature source Oral, resp. rate 20, height 6' 1\" (1.854 m), SpO2 95 %.' on RA  Gen:  No distress. Eyes:  PERRL. No sclera icterus. No conjunctival injection. ENT:  No discharge. Pharynx clear. Neck:  Trachea midline. No obvious mass. Resp:  No accessory muscle use. R sided crackles. No wheezes. No rhonchi. No dullness on percussion. CV:  Regular rate. Regular rhythm. No murmur or rub. No edema. Peripheral pulses are 2+. Capillary refill is less than 3 seconds. GI:  Non-tender. Non-distended. No hernia. Skin:  Warm and dry. No nodule on exposed extremities. Lymph:  No cervical LAD. No supraclavicular LAD. M/S:  No cyanosis. No joint deformity. No clubbing. Neuro:  Awake. Alert. Moves all four extremities. Psych:  Oriented x 3. No anxiety. LABS:  CBC:   Recent Labs     12/04/22 2105   WBC 8.8   HGB 15.0   HCT 42.9   MCV 81.3        BMP:   Recent Labs     12/04/22 2105 12/05/22  0934    140   K 3.3* 2.8*    101   CO2 27 29   BUN 20 15   CREATININE 0.9 0.9     LIVER PROFILE:   Recent Labs     12/04/22 2105   AST 22   ALT 17   BILITOT 0.6   ALKPHOS 91     PT/INR: No results for input(s): PROTIME, INR in the last 72 hours. APTT: No results for input(s): APTT in the last 72 hours.   UA:No results for input(s): NITRITE, COLORU, PHUR, LABCAST, WBCUA, RBCUA, MUCUS, TRICHOMONAS, YEAST, BACTERIA, CLARITYU, SPECGRAV, LEUKOCYTESUR, UROBILINOGEN, BILIRUBINUR, BLOODU, GLUCOSEU, AMORPHOUS in the last 72 hours. Invalid input(s): KETONESU  No results for input(s): PHART, ZIG2VEJ, PO2ART in the last 72 hours. Micro:   12/4/2022 SARS-CoV-2 & influenza not detected  Procalcitonin 0.09  CRP 5.8  ProBNP 202    Imaging: Chest imaging was reviewed by me and showed:  ACT 7/13/2019 @ [facility]:   Lower Chest:  Visualized portion of the lower chest demonstrates no acute   abnormality. CTA 12/4/2022, no comparison  Pulmonary Arteries: The pulmonary arteries are completely under filled. However, no central pulmonary emboli. Mediastinum: No evidence of mediastinal lymphadenopathy. The heart and   pericardium demonstrate no acute abnormality. There is no acute abnormality   of the thoracic aorta. Lungs/pleura: Mild ground-glass opacities in the right upper lobe, left upper   lobe, right lower lobe and left lower lobe. No visualized consolidation       Impression   1. None study is suboptimal.  No central pulmonary emboli. 2. Probable atypical pneumonia. CT head 12/4/22 negative    Echo pending     ASSESSMENT:  Acute pulmonary edema  Hypertensive emergency  Chest pain   Abnormal CT chest - mild bilateral GGO   Hypokalemia   Uncontrolled HTN, non-compliant with home meds   Never smoker     PLAN:  Transfer to ICU for uncontrolled hypertensive emergency   Nicardipine gtt with goal -180  Azithromycin/Ceftriaxone D#1   Echo pending  D/W IM    Total critical care time caring for this patient with life threatening, unstable organ failure, including direct patient contact, management of life support systems, review of data including imaging and labs, discussions with other team members and physicians is 44 minutes so far today, excluding procedures and excluding time spent by CMT as below.    I spent a total of 15 minutes on this encounter on chart review, history taking and review of data. I independently performed the above physical exam and updated this encounter note as needed.    Vinayak Cordero PA-C on 12/5/22 at 10:52 AM EST

## 2022-12-05 NOTE — PROGRESS NOTES
Brief Progress Note    Admit Date:  12/4/2022    -pmhx of bipolar disorder, DM, HTN   -presented with hemoptysis and chest pain     Subjective:  Mr. Gonzales Brand today says he is feeling much better and has not had any more episodes of chest pain or hemoptysis. Cough is resolved as well. Objective:   Patient Vitals for the past 4 hrs:   BP Pulse Resp SpO2   12/05/22 1845 (!) 199/98 95 19 99 %   12/05/22 1830 (!) 189/93 88 25 98 %   12/05/22 1815 (!) 179/99 86 22 98 %   12/05/22 1700 (!) 223/100 -- -- --   12/05/22 1600 (!) 197/113 97 18 --          Intake/Output Summary (Last 24 hours) at 12/5/2022 1859  Last data filed at 12/5/2022 0100  Gross per 24 hour   Intake 250 ml   Output --   Net 250 ml       Physical Exam:    Gen: No distress. Alert. Pleasant male   Eyes: PERRL. No sclera icterus. No conjunctival injection. Neck: No JVD. Trachea midline. Resp: No accessory muscle use. No crackles. No wheezes. No rhonchi. +slightly diminished breath sounds   CV: Regular rate. Regular rhythm. No murmur. No rub. No edema. Peripheral Pulses: +2 palpable, equal bilaterally   GI: Non-tender. Non-distended. Normal bowel sounds. Skin: Warm and dry. No nodule on exposed extremities. No rash on exposed extremities. M/S: No cyanosis. No joint deformity. No clubbing. Neuro: Awake. Grossly nonfocal    Psych: Oriented x 3. No anxiety or agitation.          Medications:  metoprolol tartrate, 50 mg, BID  cefTRIAXone (ROCEPHIN) IV, 1,000 mg, Q24H  azithromycin, 500 mg, Q24H  aspirin, 81 mg, Daily  insulin lispro, 0-4 Units, TID WC  insulin lispro, 0-4 Units, Nightly  sodium chloride flush, 5-40 mL, 2 times per day    PRN Medications:  perflutren lipid microspheres, 1.5 mL, ONCE PRN  magnesium sulfate, 2,000 mg, PRN  labetalol, 20 mg, Q4H PRN  potassium chloride, 20 mEq, PRN   Or  potassium chloride, 10 mEq, PRN  glucose, 4 tablet, PRN  dextrose bolus, 125 mL, PRN   Or  dextrose bolus, 250 mL, PRN  glucagon (rDNA), 1 mg, PRN  dextrose, , Continuous PRN  hydrALAZINE, 10 mg, Q6H PRN  sodium chloride flush, 5-40 mL, PRN  sodium chloride, , PRN  ondansetron, 4 mg, Q8H PRN   Or  ondansetron, 4 mg, Q6H PRN  polyethylene glycol, 17 g, Daily PRN  acetaminophen, 650 mg, Q6H PRN   Or  acetaminophen, 650 mg, Q6H PRN        Data:  CBC:   Recent Labs     12/04/22 2105 12/05/22  0934   WBC 8.8 7.3   HGB 15.0 14.8   HCT 42.9 42.9   MCV 81.3 81.6    210     BMP:   Recent Labs     12/04/22 2105 12/05/22  0934    140   K 3.3* 2.8*    101   CO2 27 29   BUN 20 15   CREATININE 0.9 0.9     LIVER PROFILE:   Recent Labs     12/04/22 2105   AST 22   ALT 17   BILITOT 0.6   ALKPHOS 91     PT/INR: No results for input(s): PROTIME, INR in the last 72 hours. CULTURES  COVID and Flu not detected      RADIOLOGY  CT CHEST PULMONARY EMBOLISM W CONTRAST   Final Result   1. None study is suboptimal.  No central pulmonary emboli. 2. Probable atypical pneumonia. CT HEAD WO CONTRAST   Final Result   No acute intracranial abnormality. Assessment/Plan:  #Hemoptysis   #Chest pain   -CTA chest above with no PE   -possible atypical pna vs CHF?    -procalc neg, no WBC, afebrile  -zithromax and rocephin D #2, will defer discontinuing abx to pulmonology   -echo pending, does not appear overtly fluid overloaded   -stable on RA   -NPO in case of bronch  -Hgb stable, monitor with CBC   -EKG pending   -trops 0.01 x3   -has not had any further episodes today and CP has resolved   -pulmonology consulted   -will consider cards consult     #Hypertensive urgency   -noncompliant with home regimen   -IV lopressor with IV prn hydralazine, continue to monitor BP, has been slowly down trending   -CT head negative   -CT chest as above     #Hypokalemia   -replacement protocol   -monitor with BMPs     #DM type 2   -holding oral metformin   -SSI     #Bipolar disorder   -with manic behavior   -no home medication regimen?  -psychiatry consulted    DVT Prophylaxis: SCDs  Diet: ADULT DIET;  Regular; 4 carb choices (60 gm/meal)  Code Status: Full Code    CHIQUI Estes  12/05/22

## 2022-12-05 NOTE — FLOWSHEET NOTE
12/05/22 1059   Vital Signs   Temp 97 °F (36.1 °C)   Temp Source Oral   Heart Rate 85   Heart Rate Source Monitor   Resp 19   BP (!) 185/92   MAP (Calculated) 123   Patient Position Sitting   Level of Consciousness 0   MEWS Score 1   Pain Assessment   Pain Assessment 0-10   Pain Level 0   Oxygen Therapy   SpO2 98 %   O2 Device None (Room air)   O2 Flow Rate (L/min) 0 L/min     Pt admitted to PCU from ER. Was an ER hold. Admission completed in ER. Morning meds given in ER. Pt o/a x4. Oriented to room and call light within reach. Pt assessment complete; see flow sheets. No s/s of distress. Pt stable. K 2.8; one bag of potassium PRN hung.      Jesus Manuel Motley RN

## 2022-12-05 NOTE — H&P
Hospital Medicine History & Physical      PCP: No primary care provider on file. Date of Admission: 12/4/2022    Date of Service: Pt seen/examined on 12/5/22 and Admitted to Inpatient with expected LOS greater than two midnights due to medical therapy. Chief Complaint:  hemoptysis       History Of Present Illness:      35 y.o. male presents to the ER following onset of chest pain followed by hemoptysis earlier this evening. Denies fever,chills productive cough, malaise, body aches, change in taste / smell, sore throat or shortness of breath. He noted onset of precordial chest pain lasting minutes occurring as he was preparing to take a nap, relieved with rest.Immediately after chest pain started, he developed a cough productive of pink foam.     Patient admits to history of non compliance with antihypertensives, diabetic meds  Past Medical History:          Diagnosis Date    ADHD     Bipolar 1 disorder ([facility]     Diabetes mellitus ([facility]     Hypertension        Past Surgical History:          Procedure Laterality Date    ANKLE SURGERY Left        Medications Prior to Admission:      Prior to Admission medications    Medication Sig Start Date End Date Taking? Authorizing Provider   aspirin 81 MG chewable tablet Take 81 mg by mouth daily  Patient not taking: Reported on 12/4/2022    Historical Provider, MD   lisinopril-hydroCHLOROthiazide (PRINZIDE;ZESTORETIC) 20-25 MG per tablet Take 1 tablet by mouth daily  Patient not taking: No sig reported 11/1/21   KIMBER Sloan CNP   metFORMIN (GLUCOPHAGE) 500 MG tablet Take 1 tablet by mouth 2 times daily (with meals)  Patient not taking: Reported on 12/4/2022 11/1/21   KIMBER Sloan CNP       Allergies:  Lisinopril-hydrochlorothiazide    Social History:         TOBACCO:   reports that he has never smoked. He has never used smokeless tobacco.  ETOH:   reports that he does not currently use alcohol.       Family History:         (+) premature CAD - Father with AMI in his 42's    REVIEW OF SYSTEMS:   Pertinent positives as noted in the HPI. All other systems reviewed and negative. PHYSICAL EXAM PERFORMED:    BP (!) 211/113   Pulse 77   Temp 98.9 °F (37.2 °C) (Oral)   Resp 25   Ht 6' 1\" (1.854 m)   SpO2 95%   BMI 41.43 kg/m²     General appearance:  No apparent distress, appears stated age and cooperative. HEENT:  Normal cephalic, atraumatic without obvious deformity. Pupils equal, round, and reactive to light. Extra ocular muscles intact. Conjunctivae/corneas clear. Neck: Supple, with full range of motion. No jugular venous distention. Trachea midline. Respiratory:  Normal respiratory effort. Clear to auscultation, bilaterally without Rales/Wheezes/Rhonchi. Cardiovascular:  Regular rate and rhythm with normal S1/S2 without murmurs, rubs or gallops. Abdomen: Soft, non-tender, non-distended with normal bowel sounds. Musculoskeletal:  No clubbing, cyanosis or edema bilaterally. Full range of motion without deformity. Skin: Skin color, texture, turgor normal.    Neurologic:  Neurovascularly intact without any focal sensory/motor deficits. Cranial nerves: II-XII intact, grossly non-focal.  Psychiatric:  Alert and oriented, thought content appropriate, normal insight  Capillary Refill: Brisk,< 3 seconds   Peripheral Pulses: +2 palpable, equal bilaterally       Labs:     Recent Labs     12/04/22 2105   WBC 8.8   HGB 15.0   HCT 42.9        Recent Labs     12/04/22 2105      K 3.3*      CO2 27   BUN 20   CREATININE 0.9   CALCIUM 8.7     Recent Labs     12/04/22 2105   AST 22   ALT 17   BILITOT 0.6   ALKPHOS 91     No results for input(s): INR in the last 72 hours. Recent Labs     12/04/22 2105   TROPONINI <0.01       Urinalysis:    No results found for: Ewa Beans, BACTERIA, RBCUA, BLOODU, SPECGRAV, GLUCOSEU    Radiology:        CT CHEST PULMONARY EMBOLISM W CONTRAST   Final Result   1.  None study is suboptimal.  No central pulmonary emboli. 2. Probable atypical pneumonia. CT HEAD WO CONTRAST   Final Result   No acute intracranial abnormality. ASSESSMENT:    Active Hospital Problems    Diagnosis Date Noted    Hemoptysis [R04.2] 12/04/2022     Priority: Medium         PLAN:      1) hemoptysis  - (+) tobacco history  - Pulmo consulted    2) new onset chf  - echo, serial trop, tele  - check flp  - stress test?    3) Uncontrolled htn  - non compliance  - IV metoprolol    4) DM  - corrective ISS    DVT Prophylaxis: scd  Diet: Diet NPO  Code Status: Full Code       Lona Lo MD    Thank you No primary care provider on file. for the opportunity to be involved in this patient's care. If you have any questions or concerns please feel free to contact me at [phone].

## 2022-12-05 NOTE — FLOWSHEET NOTE
12/05/22 0845   Vital Signs   Temp 98.2 °F (36.8 °C)   Temp Source Oral   Heart Rate 73   Heart Rate Source Monitor   Resp 14   BP (!) 214/97   MAP (Calculated) 136   MAP (mmHg) 126   Patient Position Semi fowlers   Level of Consciousness 0   MEWS Score 2   Oxygen Therapy   SpO2 97 %   O2 Device None (Room air)   Patient is resting showing no s/s of distress. Patient is alert and oriented. Meds were given, see MAR. Patient is denying any needs. Bed is in lowest position and call light is within reach. Will continue to monitor. Shift assessment complete, see flowsheets. Patient states he takes no medications at home because technology is the reason he has high BP. RN updated CHIQUI Perez and Rona Shields, [facility] that patient takes no medications at home. Patient denies chest pain. Light headedness or any discomfort. RN sent down labs for lab to collect AM labs.

## 2022-12-05 NOTE — PROGRESS NOTES
Writer was in chart r/t admission to [facility] initially. Reviewed Vitals and notified clinical and charge of concerns r/t high b/p.  Anitha Lizarraga RN

## 2022-12-05 NOTE — PROGRESS NOTES
[place] [employer] called stating that Pt's visitor was on the phone with  their   PCP  c/o   SI. Clinical and charge notified. security called Pt's visitor brought to ER to be evaluated with Pscyh. Pt obviously very anxious   and   worked  up over this.     Suman Maddox RN

## 2022-12-05 NOTE — CARE COORDINATION
Case Management Assessment  Initial Evaluation      Patient Name: Ellie Gaviria  YOB: 1980  Diagnosis: Hemoptysis [R04.2]  Date / Time: 12/4/2022  7:58 PM    Admission status/Date: Inpatient 12/05/2022  Chart Reviewed: Yes      Patient Interviewed: Yes   Family Interviewed:  No      Hospitalization in the last 30 days:  No      Health Care Decision Maker :     (CM - must 1st enter selection under Navigator - emergency contact- Health Care Decision Maker Relationship and pick relationship)   Who do you trust or have selected to make healthcare decisions for you      Met with: Patient at bedside. Current PCP: Has no PCP; Provided patient with [facility] brochure. Financial  Pending GIACOMO  Precert required for SNF : N          3 night stay required - N    ADLS  Support Systems/Care Needs: Family Members, Friends/Neighbors  Transportation:  self/friends    Meal Preparation: self    Housing  Living Arrangements: Lives in home w/friends  Steps: [address] for return to present living arrangements: Yes  Identified Issues: see note    Home Care Information  Active with Home Health Care : No Agency:(Services)  Type of Home Care Services: None  Passport/Waiver : No  :                      Phone Number:    Passport/Waiver Services: n/a           Durable Medical Equiptment   DME Provider: n/a   Equipment: n/a     Home O2 Use :  No    If No for home O2---if presently on O2 during hospitalization:  No  if yes CM to follow for potential DC O2 need  Informed of need for care provider to bring portable home O2 tank on day of discharge for nursing to connect prior to leaving:   Not Indicated  Verbalized agreement/Understanding:   Not Indicated    Community Service Affiliation  Dialysis:  No    Agency:  Location:  Dialysis Schedule:  Phone:   Fax: Other Community Services: n/a     DISCHARGE PLAN: Explained Case Management role/services.  CM reviewed chart and met with patient at bedside to discuss discharge needs and plan. Patient lives in home with friends. IPTA. Plans return. CM consulted by CHIQUI Bledsoe, for community mental health resources. Discussed [place]  with patient. Voiced interest in [address] [place] [address] location; Contact information placed on discharge instructions. Psych consult pending.     Joesph Zimmerman RN

## 2022-12-05 NOTE — FLOWSHEET NOTE
12/05/22 1600   Vital Signs   Heart Rate 97   Heart Rate Source Monitor   Resp 18   BP (!) 197/113   MAP (Calculated) 141     Scheduled IV lopressor given. Pt asymptomatic with elevated BP. CHIQUI Laguerre made aware of elevated BP. Goal for SBP to be less than 180. Orders to recheck BP in an hour and administer hydralazine if BP isn't within goal rate.     Oliva Remy RN

## 2022-12-05 NOTE — FLOWSHEET NOTE
12/05/22 0915   Vital Signs   Heart Rate 74   Resp 11   BP (!) 178/94   MAP (Calculated) 122   MAP (mmHg) 117   Oxygen Therapy   SpO2 99 %   Patient's BP has improved

## 2022-12-05 NOTE — ED NOTES
Spoke with Dr. Rubi Mariee regarding patient and patient's BP. Patient not due for hydralazine until 0315.      Leatha Camejo RN  12/05/22 8931

## 2022-12-05 NOTE — FLOWSHEET NOTE
12/05/22 1700   Vital Signs   BP (!) 223/100   MAP (Calculated) 141     PRN hydralazine given; PA made aware. Pt asymptomatic; awaiting orders. Addendum [phone]: Advised to message the MD. Leanna Paez at this time. Awaiting orders. Addendum [phone]: Order to transfer to  ICU for BP control.   Roe Yang RN

## 2022-12-05 NOTE — ED NOTES
Patient reports coughing up \"pink\" stuff for the last 2-2.5 hours. And dizziness for 30 minutes before that. He reports PMHx of bipolar, ADHD, diabetes, HTN but does not take any prescription medications. He states he takes aleve as needed for pain, vitamin D, potassium, cherry chewables.      Stoney Zelaya RN  12/04/22 2025

## 2022-12-05 NOTE — PROGRESS NOTES
ICU tx. Rep given to Elijah Mobley. Addressed all nursing and patient concerns.     Adriane Hammond RN

## 2022-12-05 NOTE — FLOWSHEET NOTE
12/05/22 0900   Vital Signs   Heart Rate 70   Resp 22   BP (!) 204/99   MAP (Calculated) 134   MAP (mmHg) 127   Oxygen Therapy   SpO2 96 %   PRN hydralazine administered per Stevie Moe

## 2022-12-05 NOTE — DISCHARGE INSTRUCTIONS
[facility], [address]  [phone] [facility] location)   Fax: [phone]    Please call to make an appointment with them  [facility]  Phone: [phone]    You were seen by Mandi Mina.   Dr. Debra Campuzano  His office can be reached at [phone] if you have outstanding questions/concerns related to your ICU stay

## 2022-12-05 NOTE — DISCHARGE INSTR - COC
Continuity of Care Form    Patient Name: Sahra Cobb   :  1980  MRN:  [id number]    Admit date:  2022  Discharge date:  ***    Code Status Order: Full Code   Advance Directives:     Admitting Physician:  Sarah Whitley MD  PCP: No primary care provider on file. Discharging Nurse: [facility] Unit/Room#: /1400-50  Discharging Unit Phone Number: ***    Emergency Contact:   Extended Emergency Contact Information  Primary Emergency Contact: Toy Hernandez Phone: [phone]  Work Phone: [phone]  Mobile Phone: [phone]  Relation: Other   needed? No    Past Surgical History:  Past Surgical History:   Procedure Laterality Date    ANKLE SURGERY Left        Immunization History: There is no immunization history on file for this patient.     Active Problems:  Patient Active Problem List   Diagnosis Code    Hemoptysis R04.2       Isolation/Infection:   Isolation            No Isolation          Patient Infection Status       Infection Onset Added Last Indicated Last Indicated By Review Planned Expiration Resolved Resolved By    None active    Resolved    COVID-19 (Rule Out) 22 COVID-19 & Influenza Combo (Ordered)   22 Rule-Out Test Resulted            Nurse Assessment:  Last Vital Signs: BP (!) 185/92   Pulse 85   Temp 97 °F (36.1 °C) (Oral)   Resp 19   Ht 6' 1\" (1.854 m)   Wt 282 lb 1.6 oz (128 kg)   SpO2 98%   BMI 37.22 kg/m²     Last documented pain score (0-10 scale): Pain Level: 0  Last Weight:   Wt Readings from Last 1 Encounters:   22 282 lb 1.6 oz (128 kg)     Mental Status:  {IP PT MENTAL STATUS:}    IV Access:  { LACI IV ACCESS:149076176}    Nursing Mobility/ADLs:  Walking   {CHP DME FPMC:986171172}  Transfer  {CHP DME NQBL:552032146}  Bathing  {CHP DME RVJE:164153240}  Dressing  {CHP DME FHEA:048377433}  Toileting  {CHP DME VOVJ:197949876}  Feeding  {CHP DME CRX}  Med Admin  {CHP DME WDVV:086015371}  Med Delivery   [facility] MED Delivery:253296810}    Wound Care Documentation and Therapy:        Elimination:  Continence: Bowel: {YES / PF:04569}  Bladder: {YES / LV:58946}  Urinary Catheter: {Urinary Catheter:932528146}   Colostomy/Ileostomy/Ileal Conduit: {YES / NZ:89058}       Date of Last BM: ***    Intake/Output Summary (Last 24 hours) at 2022 1556  Last data filed at 2022 0100  Gross per 24 hour   Intake 250 ml   Output --   Net 250 ml     I/O last 3 completed shifts:   In: 250 [IV Piggyback:250]  Out: -     Safety Concerns:     [address] Concerns:605743225}    Impairments/Disabilities:      [facility] Impairments/Disabilities:873196326}    Nutrition Therapy:  Current Nutrition Therapy:   [facility] Diet List:150053536}    Routes of Feeding: {CHP DME Other Feedings:754162845}  Liquids: {Slp liquid thickness:59335}  Daily Fluid Restriction: {CHP DME Yes amt example:802554400}  Last Modified Barium Swallow with Video (Video Swallowing Test): {Done Not Done LVPV:436783007}    Treatments at the Time of Hospital Discharge:   Respiratory Treatments: ***  Oxygen Therapy:  {Therapy; copd oxygen:06282}  Ventilator:    { CC Vent GOJA:393985361}    Rehab Therapies: {THERAPEUTIC INTERVENTION:3702213902}  Weight Bearing Status/Restrictions: [facility] Weight Bearin}  Other Medical Equipment (for information only, NOT a DME order):  {EQUIPMENT:049605585}  Other Treatments: ***    Patient's personal belongings (please select all that are sent with patient):  {CHP DME Belongings:131178236}    RN SIGNATURE:  {Esignature:238834359}    CASE MANAGEMENT/SOCIAL WORK SECTION    Inpatient Status Date: 2022    [facility], [address] [address]  [phone] [facility] location   Fax: [phone] [facility] location  Please call to make an appointment with them  [facility]  Phone: [phone]      Readmission Risk Assessment Score:  Readmission Risk              Risk of Unplanned Readmission:  7           Discharging to Facility/ Agency   Name:   Address:  Phone:  Fax:    Dialysis Facility (if applicable)   Name:  Address:  Dialysis Schedule:  Phone:  Fax:    / signature: {Carleneature:215607289}    PHYSICIAN SECTION    Prognosis: {Prognosis:8821320753}    Condition at Discharge: [facility] Patient Condition:352798842}    Rehab Potential (if transferring to Rehab): {Prognosis:5362547972}    Recommended Labs or Other Treatments After Discharge: ***    Physician Certification: I certify the above information and transfer of Ramonia Finders  is necessary for the continuing treatment of the diagnosis listed and that he requires {Admit to Appropriate Level of Care:42302} for {GREATER/LESS:755623345} 30 days.      Update Admission H&P: {CHP DME Changes in GOHAL:501822384}    PHYSICIAN SIGNATURE:  {Esignature:251506500}

## 2022-12-06 PROBLEM — F31.31 BIPOLAR I DISORDER, MOST RECENT EPISODE DEPRESSED, MILD (HCC): Status: ACTIVE | Noted: 2022-12-06

## 2022-12-06 LAB
ANION GAP SERPL CALCULATED.3IONS-SCNC: 8 MMOL/L (ref 3–16)
BASOPHILS ABSOLUTE: 0.1 K/UL (ref 0–0.2)
BASOPHILS RELATIVE PERCENT: 1.5 %
BUN BLDV-MCNC: 17 MG/DL (ref 7–20)
CALCIUM SERPL-MCNC: 8.7 MG/DL (ref 8.3–10.6)
CHLORIDE BLD-SCNC: 101 MMOL/L (ref 99–110)
CO2: 27 MMOL/L (ref 21–32)
CREAT SERPL-MCNC: 1 MG/DL (ref 0.9–1.3)
EKG ATRIAL RATE: 66 BPM
EKG ATRIAL RATE: 89 BPM
EKG DIAGNOSIS: NORMAL
EKG DIAGNOSIS: NORMAL
EKG P AXIS: 27 DEGREES
EKG P AXIS: 42 DEGREES
EKG P-R INTERVAL: 160 MS
EKG P-R INTERVAL: 162 MS
EKG Q-T INTERVAL: 412 MS
EKG Q-T INTERVAL: 464 MS
EKG QRS DURATION: 104 MS
EKG QRS DURATION: 96 MS
EKG QTC CALCULATION (BAZETT): 486 MS
EKG QTC CALCULATION (BAZETT): 501 MS
EKG R AXIS: -26 DEGREES
EKG R AXIS: -33 DEGREES
EKG T AXIS: 16 DEGREES
EKG T AXIS: 75 DEGREES
EKG VENTRICULAR RATE: 66 BPM
EKG VENTRICULAR RATE: 89 BPM
EOSINOPHILS ABSOLUTE: 0.3 K/UL (ref 0–0.6)
EOSINOPHILS RELATIVE PERCENT: 2.7 %
GFR SERPL CREATININE-BSD FRML MDRD: >60 ML/MIN/{1.73_M2}
GLUCOSE BLD-MCNC: 135 MG/DL (ref 70–99)
GLUCOSE BLD-MCNC: 145 MG/DL (ref 70–99)
GLUCOSE BLD-MCNC: 153 MG/DL (ref 70–99)
HCT VFR BLD CALC: 41.8 % (ref 40.5–52.5)
HEMOGLOBIN: 14.7 G/DL (ref 13.5–17.5)
LYMPHOCYTES ABSOLUTE: 2.1 K/UL (ref 1–5.1)
LYMPHOCYTES RELATIVE PERCENT: 22.5 %
MAGNESIUM: 2.3 MG/DL (ref 1.8–2.4)
MCH RBC QN AUTO: 28.5 PG (ref 26–34)
MCHC RBC AUTO-ENTMCNC: 35.3 G/DL (ref 31–36)
MCV RBC AUTO: 80.6 FL (ref 80–100)
MONOCYTES ABSOLUTE: 0.9 K/UL (ref 0–1.3)
MONOCYTES RELATIVE PERCENT: 9.3 %
NEUTROPHILS ABSOLUTE: 5.9 K/UL (ref 1.7–7.7)
NEUTROPHILS RELATIVE PERCENT: 64 %
PDW BLD-RTO: 14 % (ref 12.4–15.4)
PERFORMED ON: ABNORMAL
PERFORMED ON: ABNORMAL
PLATELET # BLD: 235 K/UL (ref 135–450)
PMV BLD AUTO: 8.7 FL (ref 5–10.5)
POTASSIUM REFLEX MAGNESIUM: 3.1 MMOL/L (ref 3.5–5.1)
RBC # BLD: 5.18 M/UL (ref 4.2–5.9)
SODIUM BLD-SCNC: 136 MMOL/L (ref 136–145)
WBC # BLD: 9.2 K/UL (ref 4–11)

## 2022-12-06 PROCEDURE — 2580000003 HC RX 258: Performed by: INTERNAL MEDICINE

## 2022-12-06 PROCEDURE — 85025 COMPLETE CBC W/AUTO DIFF WBC: CPT

## 2022-12-06 PROCEDURE — 2580000003 HC RX 258: Performed by: HOSPITALIST

## 2022-12-06 PROCEDURE — 99255 IP/OBS CONSLTJ NEW/EST HI 80: CPT | Performed by: PSYCHIATRY & NEUROLOGY

## 2022-12-06 PROCEDURE — 6370000000 HC RX 637 (ALT 250 FOR IP): Performed by: INTERNAL MEDICINE

## 2022-12-06 PROCEDURE — 2060000000 HC ICU INTERMEDIATE R&B

## 2022-12-06 PROCEDURE — 6360000002 HC RX W HCPCS: Performed by: INTERNAL MEDICINE

## 2022-12-06 PROCEDURE — 80048 BASIC METABOLIC PNL TOTAL CA: CPT

## 2022-12-06 PROCEDURE — 99233 SBSQ HOSP IP/OBS HIGH 50: CPT | Performed by: INTERNAL MEDICINE

## 2022-12-06 PROCEDURE — 36415 COLL VENOUS BLD VENIPUNCTURE: CPT

## 2022-12-06 PROCEDURE — 6360000002 HC RX W HCPCS: Performed by: HOSPITALIST

## 2022-12-06 PROCEDURE — 2500000003 HC RX 250 WO HCPCS: Performed by: INTERNAL MEDICINE

## 2022-12-06 PROCEDURE — 83735 ASSAY OF MAGNESIUM: CPT

## 2022-12-06 PROCEDURE — 99232 SBSQ HOSP IP/OBS MODERATE 35: CPT | Performed by: INTERNAL MEDICINE

## 2022-12-06 PROCEDURE — 93005 ELECTROCARDIOGRAM TRACING: CPT | Performed by: INTERNAL MEDICINE

## 2022-12-06 PROCEDURE — 6370000000 HC RX 637 (ALT 250 FOR IP): Performed by: HOSPITALIST

## 2022-12-06 RX ORDER — ENOXAPARIN SODIUM 100 MG/ML
30 INJECTION SUBCUTANEOUS 2 TIMES DAILY
Status: DISCONTINUED | OUTPATIENT
Start: 2022-12-06 | End: 2022-12-08 | Stop reason: HOSPADM

## 2022-12-06 RX ORDER — POTASSIUM CHLORIDE 750 MG/1
40 TABLET, EXTENDED RELEASE ORAL ONCE
Status: COMPLETED | OUTPATIENT
Start: 2022-12-06 | End: 2022-12-06

## 2022-12-06 RX ORDER — LOSARTAN POTASSIUM 25 MG/1
50 TABLET ORAL DAILY
Status: DISCONTINUED | OUTPATIENT
Start: 2022-12-06 | End: 2022-12-07

## 2022-12-06 RX ORDER — SPIRONOLACTONE 25 MG/1
25 TABLET ORAL DAILY
Status: DISCONTINUED | OUTPATIENT
Start: 2022-12-06 | End: 2022-12-06

## 2022-12-06 RX ADMIN — POTASSIUM CHLORIDE 10 MEQ: 7.46 INJECTION, SOLUTION INTRAVENOUS at 16:28

## 2022-12-06 RX ADMIN — DEXTROSE MONOHYDRATE 5 MG/HR: 50 INJECTION, SOLUTION INTRAVENOUS at 05:17

## 2022-12-06 RX ADMIN — SODIUM CHLORIDE, PRESERVATIVE FREE 10 ML: 5 INJECTION INTRAVENOUS at 09:01

## 2022-12-06 RX ADMIN — AZITHROMYCIN 500 MG: 500 INJECTION, POWDER, LYOPHILIZED, FOR SOLUTION INTRAVENOUS at 00:34

## 2022-12-06 RX ADMIN — SODIUM CHLORIDE, PRESERVATIVE FREE 10 ML: 5 INJECTION INTRAVENOUS at 22:01

## 2022-12-06 RX ADMIN — LOSARTAN POTASSIUM 50 MG: 25 TABLET, FILM COATED ORAL at 10:01

## 2022-12-06 RX ADMIN — ENOXAPARIN SODIUM 30 MG: 100 INJECTION SUBCUTANEOUS at 22:01

## 2022-12-06 RX ADMIN — POTASSIUM CHLORIDE 40 MEQ: 750 TABLET, EXTENDED RELEASE ORAL at 10:01

## 2022-12-06 RX ADMIN — DEXTROSE MONOHYDRATE 10 MG/HR: 50 INJECTION, SOLUTION INTRAVENOUS at 01:12

## 2022-12-06 RX ADMIN — ASPIRIN 81 MG: 81 TABLET, CHEWABLE ORAL at 08:02

## 2022-12-06 RX ADMIN — MUPIROCIN: 20 OINTMENT TOPICAL at 09:01

## 2022-12-06 RX ADMIN — POTASSIUM CHLORIDE 10 MEQ: 7.46 INJECTION, SOLUTION INTRAVENOUS at 07:10

## 2022-12-06 RX ADMIN — POTASSIUM CHLORIDE 10 MEQ: 7.46 INJECTION, SOLUTION INTRAVENOUS at 00:49

## 2022-12-06 RX ADMIN — METOPROLOL TARTRATE 50 MG: 50 TABLET, FILM COATED ORAL at 08:02

## 2022-12-06 RX ADMIN — POTASSIUM CHLORIDE 10 MEQ: 7.46 INJECTION, SOLUTION INTRAVENOUS at 17:57

## 2022-12-06 RX ADMIN — MUPIROCIN: 20 OINTMENT TOPICAL at 22:00

## 2022-12-06 RX ADMIN — METOPROLOL TARTRATE 50 MG: 50 TABLET, FILM COATED ORAL at 22:00

## 2022-12-06 RX ADMIN — POTASSIUM CHLORIDE 10 MEQ: 7.46 INJECTION, SOLUTION INTRAVENOUS at 09:01

## 2022-12-06 ASSESSMENT — PAIN SCALES - GENERAL: PAINLEVEL_OUTOF10: 0

## 2022-12-06 NOTE — FLOWSHEET NOTE
12/06/22 1545   Vital Signs   Temp 97.1 °F (36.2 °C)   Temp Source Oral   Heart Rate 67   Heart Rate Source Monitor   Resp 19   BP (!) 164/91   MAP (Calculated) 115   Patient Position Semi fowlers   Level of Consciousness 0   MEWS Score 1   Pain Assessment   Pain Assessment 0-10   Pain Level 0   Oxygen Therapy   SpO2 99 %   O2 Device None (Room air)   O2 Flow Rate (L/min) 0 L/min     Pt tx to PCU. O/A x4. Oriented to room and call light. Orders released. Pt stable. Patient is able to demonstrate the ability to move from a reclining position to an upright position within the recliner.       Erica Garrett RN

## 2022-12-06 NOTE — PROGRESS NOTES
Patient report given to Cherylene Gehrig, RN. Patient resting comfortably at present. Cardene continues at 5mg/hr. Care transferred.

## 2022-12-06 NOTE — PROGRESS NOTES
Brief Progress Note    Admit Date:  12/4/2022    -pmhx of bipolar disorder, DM, HTN   -presented with hemoptysis and chest pain     Subjective:  Mr. Waqar Medina has not had any more hemoptysis  Transferred to the ICU for hypertensive urgency  Placed on nicardipine drip    Currently off nicardipine drip    Objective:   Patient Vitals for the past 4 hrs:   BP Pulse Resp SpO2   12/06/22 0730 (!) 142/62 69 23 (!) 88 %   12/06/22 0700 129/64 68 23 94 %   12/06/22 0635 (!) 143/84 66 21 94 %   12/06/22 0600 (!) 149/87 72 27 93 %   12/06/22 0500 (!) 141/69 68 23 96 %            Intake/Output Summary (Last 24 hours) at 12/6/2022 2843  Last data filed at 12/6/2022 0520  Gross per 24 hour   Intake 2330 ml   Output --   Net 2330 ml         Physical Exam:    Gen: No distress. Alert. Pleasant male   Eyes: PERRL. No sclera icterus. No conjunctival injection. Neck: No JVD. Trachea midline. Resp: No accessory muscle use. No crackles. No wheezes. No rhonchi. +slightly diminished breath sounds   CV: Regular rate. Regular rhythm. No murmur. No rub. No edema. Peripheral Pulses: +2 palpable, equal bilaterally   GI: Non-tender. Non-distended. Normal bowel sounds. Skin: Warm and dry. No nodule on exposed extremities. No rash on exposed extremities. M/S: No cyanosis. No joint deformity. No clubbing. Neuro: Awake. Grossly nonfocal    Psych: Oriented x 3. No anxiety or agitation.          Medications:  metoprolol tartrate, 50 mg, BID  cefTRIAXone (ROCEPHIN) IV, 1,000 mg, Q24H  azithromycin, 500 mg, Q24H  aspirin, 81 mg, Daily  insulin lispro, 0-4 Units, TID WC  insulin lispro, 0-4 Units, Nightly  sodium chloride flush, 5-40 mL, 2 times per day    PRN Medications:  perflutren lipid microspheres, 1.5 mL, ONCE PRN  magnesium sulfate, 2,000 mg, PRN  labetalol, 20 mg, Q4H PRN  potassium chloride, 20 mEq, PRN   Or  potassium chloride, 10 mEq, PRN  glucose, 4 tablet, PRN  dextrose bolus, 125 mL, PRN   Or  dextrose bolus, 250 mL, PRN  glucagon (rDNA), 1 mg, PRN  dextrose, , Continuous PRN  hydrALAZINE, 10 mg, Q6H PRN  sodium chloride flush, 5-40 mL, PRN  sodium chloride, , PRN  ondansetron, 4 mg, Q8H PRN   Or  ondansetron, 4 mg, Q6H PRN  polyethylene glycol, 17 g, Daily PRN  acetaminophen, 650 mg, Q6H PRN   Or  acetaminophen, 650 mg, Q6H PRN        Data:  CBC:   Recent Labs     12/04/22 2105 12/05/22 0934 12/06/22 0457   WBC 8.8 7.3 9.2   HGB 15.0 14.8 14.7   HCT 42.9 42.9 41.8   MCV 81.3 81.6 80.6    210 235       BMP:   Recent Labs     12/05/22 0934 12/05/22 2046 12/06/22 0457    137 136   K 2.8* 3.3* 3.1*    100 101   CO2 29 28 27   PHOS  --  2.6  --    BUN 15 15 17   CREATININE 0.9 1.0 1.0       LIVER PROFILE:   Recent Labs     12/04/22 2105   AST 22   ALT 17   BILITOT 0.6   ALKPHOS 91       PT/INR: No results for input(s): PROTIME, INR in the last 72 hours. CULTURES  COVID and Flu not detected      RADIOLOGY  CT CHEST PULMONARY EMBOLISM W CONTRAST   Final Result   1. None study is suboptimal.  No central pulmonary emboli. 2. Probable atypical pneumonia. CT HEAD WO CONTRAST   Final Result   No acute intracranial abnormality. ECHO  Left ventricular systolic function is normal with ejection fraction   estimated at 60-65%. No regional wall motion abnormalities are noted. There is severe concentric left ventricular hypertrophy. Grade II diastolic dysfunction with elevated filling pressure. The right atrium is mildly dilated. Mild posterior mitral annular calcification is present. Systolic pulmonary artery pressure (SPAP) estimated at 35 mmHg (RA pressure   3 mmHg). Assessment/Plan:  #Hemoptysis -likely result of acute pulmonary edema with blood pressure as high as 230/120  #Chest pain   -CTA chest above with no PE   -possible atypical pna vs CHF?    -procalc neg, no WBC, afebrile  -zithromax and rocephin D #2, will defer discontinuing abx to pulmonology   -trops 0.01 x3   -has not had any further episodes today and CP has resolved   -pulmonology consulted   Cardiology consult. No stress test planned for now. Echocardiogram shows diastolic dysfunction    #Hypertensive urgency   -noncompliant with home regimen   -IV lopressor with IV prn hydralazine, continue to monitor BP, has been slowly down trending   Transferred to ICU on patient nicardipine drip  Now off nicardipine drip. Continue metoprolol. Losartan 50 mg added. #DM type 2   -holding oral metformin   -SSI     #Bipolar disorder   -with manic behavior   -no home medication regimen?  -psychiatry consulted    DVT Prophylaxis: SCDs  Diet: ADULT DIET;  Regular; 4 carb choices (60 gm/meal)  Code Status: Full Code    Transfer out of ICU  Micehlle Azul MD  12/06/22
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
## 2022-12-06 NOTE — CONSULTS
CARDIOLOGY CONSULTATION        Patient Name: Natha Shone  Date of admission: 12/4/2022  7:58 PM  Admission Dx: Hemoptysis [R04.2]  Requesting Physician: Maulik Neves MD  Primary Care physician: No primary care provider on file. Reason for Consultation/Chief Complaint: Congestive heart failure     History of Present Illness:     Natha Shone is a 35 y.o. patient with prior medical history notable for diabetes mellitus, hypertension, who presented to the hospital with complaints of chest pain followed by onset of hemoptysis. Patient was diagnosed with new onset congestive heart failure. Cardiology is consulted for further evaluation. ED course/Vital signs on presentation: Blood pressure is noted severely elevated at 208/119, heart rate, respiratory rate normal, O2 sat 93%. Patient was started on nicardipine drip. EKG showed normal sinus rhythm, nonspecific ST and T wave abnormalities, poor R wave progression, prolonged QT. CT chest performed which was an adequate for peripheral emboli assessment however no central pulmonary emboli were noted. No cardiac abnormalities noted. Mild groundglass opacities bilaterally. Labs notable for proBNP 202, stable creatinine, low potassium and magnesium levels. Patient noted 1 day of acute onset shortness of breath, productive cough and chest pain prior to admission. He began having acute onset cough productive of pinky frothy sputum, bloody appearing. His chest began to hurt. States this was a band-like distribution across anterior chest, \"tightness\" quality, lasting about 30 min. States about 3-4 weeks ago he started a walking regimen on his local road. With walking, stairs, inclines he has not had chest pains. He has been off his anti-hypertensive for some time since moving from [place]/due to lack of insurance. He has not been experiencing vision changes, headache, or stroke-like symptoms.  Denies palpitations, dizziness, near-syncope or juanito syncope. Denies history of congestive heart failure. Denies paroxysmal nocturnal dyspnea, orthopnea, bendopnea, increasing lower extremity edema or weight gain. He has lost weight he states. Fluid balance +2.4 L. No weight this morning. Nicardipine drip has been weaned. Receiving p.o. metoprolol. Echo showed normal LV function with cLVH. He has no chest pain this AM. No other complaints. Past Medical History:   has a past medical history of ADHD, Bipolar 1 disorder ([facility] [address] Diabetes mellitus ([facility] [address] and Hypertension. Surgical History:   has a past surgical history that includes Ankle surgery (Left). Social History:   reports that he has never smoked. He has never used smokeless tobacco. He reports that he does not currently use alcohol. He reports that he does not use drugs. Family History:  Father had history of CABG 52's, Congestive heart failure,  at 78  Mother had no heart history  2 brothers - no heart history      Home Medications:  Were reviewed and are listed in nursing record and/or below  Prior to Admission medications    Medication Sig Start Date End Date Taking?  Authorizing Provider   aspirin 81 MG chewable tablet Take 81 mg by mouth daily  Patient not taking: Reported on 2022    Historical Provider, MD   lisinopril-hydroCHLOROthiazide (PRINZIDE;ZESTORETIC) 20-25 MG per tablet Take 1 tablet by mouth daily  Patient not taking: No sig reported 21   KIMBER Viveros CNP   metFORMIN (GLUCOPHAGE) 500 MG tablet Take 1 tablet by mouth 2 times daily (with meals)  Patient not taking: Reported on 2022   KIMBER Viveros CNP        CURRENT Medications:  mupirocin (BACTROBAN) 2 % ointment, BID  perflutren lipid microspheres (DEFINITY) injection 1.5 mL, ONCE PRN  magnesium sulfate 2000 mg in 50 mL IVPB premix, PRN  metoprolol tartrate (LOPRESSOR) tablet 50 mg, BID  labetalol (NORMODYNE;TRANDATE) injection 20 mg, Q4H PRN  potassium chloride 20 mEq/50 mL IVPB (Central Line), PRN   Or  potassium chloride 10 mEq/100 mL IVPB (Peripheral Line), PRN  niCARdipine (CARDENE) 25 mg in dextrose 5 % 250 mL infusion, Continuous  cefTRIAXone (ROCEPHIN) 1,000 mg in dextrose 5 % 50 mL IVPB mini-bag, Q24H  azithromycin (ZITHROMAX) 500 mg in D5W 250ml addavial, Q24H  aspirin chewable tablet 81 mg, Daily  glucose chewable tablet 16 g, PRN  dextrose bolus 10% 125 mL, PRN   Or  dextrose bolus 10% 250 mL, PRN  glucagon (rDNA) injection 1 mg, PRN  dextrose 10 % infusion, Continuous PRN  insulin lispro (HUMALOG) injection vial 0-4 Units, TID WC  insulin lispro (HUMALOG) injection vial 0-4 Units, Nightly  hydrALAZINE (APRESOLINE) injection 10 mg, Q6H PRN  sodium chloride flush 0.9 % injection 5-40 mL, 2 times per day  sodium chloride flush 0.9 % injection 5-40 mL, PRN  0.9 % sodium chloride infusion, PRN  ondansetron (ZOFRAN-ODT) disintegrating tablet 4 mg, Q8H PRN   Or  ondansetron (ZOFRAN) injection 4 mg, Q6H PRN  polyethylene glycol (GLYCOLAX) packet 17 g, Daily PRN  acetaminophen (TYLENOL) tablet 650 mg, Q6H PRN   Or  acetaminophen (TYLENOL) suppository 650 mg, Q6H PRN        Allergies:  Lisinopril-hydrochlorothiazide and Other     Review of Systems:   A 14 point review of symptoms completed. Pertinent positives identified in the HPI, all other review of symptoms negative as below. Objective:     Vitals:    12/06/22 0600 12/06/22 0635 12/06/22 0700 12/06/22 0730   BP: (!) 149/87 (!) 143/84 129/64 (!) 142/62   Pulse: 72 66 68 69   Resp: 27 21 23 23   Temp:       TempSrc:       SpO2: 93% 94% 94% (!) 88%   Weight:       Height:          Weight: 282 lb 1.6 oz (128 kg)       PHYSICAL EXAM:    General:  Alert, cooperative, no distress, appears stated age   Head:  Normocephalic, atraumatic   Eyes:  Conjunctiva/corneas clear, anicteric sclerae    Nose: Nares normal, no drainage or sinus tenderness   Throat: No abnormalities of the lips, oral mucosa or tongue. Neck: Trachea midline. Neck supple with no lymphadenopathy, thyroid not enlarged, symmetric, no tenderness/mass/nodules, no Jugular venous pressure elevation    Lungs:   Clear to auscultation bilaterally, no wheezes, no rales, no respiratory distress   Chest Wall:  No deformity or tenderness to palpation   Heart:  Regular rate and rhythm, normal S1, normal S2, no murmur, no rub, no S3/S4, PMI non-displaced. No heave. Abdomen:   Obese, Soft, non-tender, with normoactive bowel sounds. No masses, no hepatosplenomegaly   Extremities: No cyanosis, clubbing or pitting edema. R hand is erythematous mid hand at PIV site ext to digits   Vascular: 2+ radial, 2+dorsalis pedis and posterior tibial pulses bilaterally. Brisk carotid upstrokes without carotid bruit. Skin: Skin color, texture, turgor are normal with no rashes or ulceration. Pysch: Euthymic mood, appropriate affect   Neurologic: Oriented to person, place and time. No slurred speech or facial asymmetry. No motor or sensory deficits on gross examination.          Labs:   CBC:   Lab Results   Component Value Date/Time    WBC 9.2 12/06/2022 04:57 AM    RBC 5.18 12/06/2022 04:57 AM    HGB 14.7 12/06/2022 04:57 AM    HCT 41.8 12/06/2022 04:57 AM    MCV 80.6 12/06/2022 04:57 AM    RDW 14.0 12/06/2022 04:57 AM     12/06/2022 04:57 AM     CMP:  Lab Results   Component Value Date/Time     12/06/2022 04:57 AM    K 3.1 12/06/2022 04:57 AM     12/06/2022 04:57 AM    CO2 27 12/06/2022 04:57 AM    BUN 17 12/06/2022 04:57 AM    CREATININE 1.0 12/06/2022 04:57 AM    GFRAA >60 11/01/2021 03:22 PM    AGRATIO 1.5 12/04/2022 09:05 PM    LABGLOM >60 12/06/2022 04:57 AM    GLUCOSE 153 12/06/2022 04:57 AM    PROT 7.1 12/04/2022 09:05 PM    CALCIUM 8.7 12/06/2022 04:57 AM    BILITOT 0.6 12/04/2022 09:05 PM    ALKPHOS 91 12/04/2022 09:05 PM    AST 22 12/04/2022 09:05 PM    ALT 17 12/04/2022 09:05 PM     PT/INR:  No results found for: PTINR  HgBA1c:  Lab Results   Component Value Date LABA1C 5.6 12/04/2022     Lab Results   Component Value Date    TROPONINI <0.01 12/05/2022       Lab Results   Component Value Date    CHOL 183 12/05/2022     Lab Results   Component Value Date    TRIG 157 (H) 12/05/2022     Lab Results   Component Value Date    HDL 29 (L) 12/05/2022     Lab Results   Component Value Date    LDLCALC 123 (H) 12/05/2022     Lab Results   Component Value Date    LABVLDL 31 12/05/2022     No results found for: [facility]     Cardiac Data:     EKG: personally reviewed as above. Telemetry personally reviewed: Sinus 60's    Echo: 12/5/22   Summary   Left ventricular systolic function is normal with ejection fraction   estimated at 60-65%. No regional wall motion abnormalities are noted. There is severe concentric left ventricular hypertrophy. Grade II diastolic dysfunction with elevated filling pressure. The right atrium is mildly dilated. Mild posterior mitral annular calcification is present. Systolic pulmonary artery pressure (SPAP) estimated at 35 mmHg (RA pressure   3 mmHg). Impression and Plan:      Hypertensive emergency complicated with acute pulmonary edema  -BP came down with Cardene, now transitioned to PO metoprolol (HR 60's) and losartan added which will be helpful in setting low K  -do not see that he required lasix; no diuretic at this time on DC. May consider aldactone in follow up if BP's remains elevated. -echo shows normal LV function. V noted - educated on hypertension and cardiac implications  -recommend OP sleep study  -establish with PCP    Chest pain  Abnormal EKG  -echo as above with normal LV function   -suspect this was due to hypertension. EKG abnormalities non-specific in setting electrolyte disturbance. He has not had chest pain previous to this with activity. -no stress testing planned at this time.  Re-evaluate in clinic for recurrence of chest pain in presence of adequately controlled BP    Abnormal CT  Pulmonary edema  Productive cough  -related to #1 above     Hypokalemia  Hypomagnesemia  -ARB as above; consider K replacement in addition     Family history of cardiovascular disease       The 10-year ASCVD risk score (Irais GUIDO, et al., 2019) is: 4%    Values used to calculate the score:      Age: 35 years      Sex: Male      Is Non- : No      Diabetic: No      Tobacco smoker: No      Systolic Blood Pressure: 959 mmHg      Is BP treated: Yes      HDL Cholesterol: 29 mg/dL      Total Cholesterol: 183 mg/dL    Cardiology service will sign off. Will arrange follow up with APNP 6 weeks       Patient Active Problem List   Diagnosis    Hemoptysis    Hypertensive emergency    Acute pulmonary edema (HCC)    Chest pain         I will address the patient's cardiac risk factors and adjusted pharmacologic treatment as needed. In addition, I have reinforced the need for patient directed risk factor modification. All questions and concerns were addressed to the patient/family. Alternatives to my treatment were discussed. Thank you for allowing us to participate in the care of [facility]. Please call me with any questions [phone].     Shaheed Palm MD, [facility]  Cardiovascular Disease  [address]  [phone] [facility]  [phone] [address] [place] [address]  12/6/2022 8:44 AM

## 2022-12-06 NOTE — PROGRESS NOTES
Pulmonary & Critical Care Medicine ICU Progress Note    CC: Hemoptysis    Events of Last 24 hours: Transferred to the ICU for uncontrolled hypertension, hypertensive emergency    Vascular lines: IV: peripheral    MV:  none     / / /   No results for input(s): PHART, VUX0IUD, PO2ART in the last 72 hours. IV:   niCARdipine 5 mg/hr (22 0520)    dextrose      sodium chloride         Vitals:  Blood pressure (!) 149/87, pulse 72, temperature 97.9 °F (36.6 °C), temperature source Oral, resp. rate 27, height 6' 1\" (1.854 m), weight 282 lb 1.6 oz (128 kg), SpO2 93 %. on RA  Temp  Av °F (36.7 °C)  Min: 97 °F (36.1 °C)  Max: 98.5 °F (36.9 °C)    Intake/Output Summary (Last 24 hours) at 2022 0704  Last data filed at 2022 0520  Gross per 24 hour   Intake 2330 ml   Output --   Net 2330 ml     PE:  General: NAD  Eyes: PERRL. No sclera icterus. No conjunctival injection. ENT: No discharge. Pharynx clear. Neck: Trachea midline. Normal thyroid. Resp: No accessory muscle use. No crackles. No wheezing. No rhonchi. No dullness on percussion. CV: Regular rate. Regular rhythm. No mumur or rub. No edema. Peripheral pulses are 2+. Capillary refill is less than 3 seconds. GI: Non-tender. Non-distended. No masses. No organomegaly. Normal bowel sounds. No hernia. Skin: Warm and dry. No nodule on exposed extremities. No rash on exposed extremities. Lymph: No cervical LAD. No supraclavicular LAD. M/S: No cyanosis. No joint deformity. No clubbing. Neuro: A&OX3.  Patellar reflexes are symmetric  Psych: No agitation, no anxiety, affect is full     Scheduled Meds:   metoprolol tartrate  50 mg Oral BID    cefTRIAXone (ROCEPHIN) IV  1,000 mg IntraVENous Q24H    azithromycin  500 mg IntraVENous Q24H    aspirin  81 mg Oral Daily    insulin lispro  0-4 Units SubCUTAneous TID WC    insulin lispro  0-4 Units SubCUTAneous Nightly    sodium chloride flush  5-40 mL IntraVENous 2 times per day       Data:  CBC:   Recent Labs     12/04/22 2105 12/05/22  0934 12/06/22  0457   WBC 8.8 7.3 9.2   HGB 15.0 14.8 14.7   HCT 42.9 42.9 41.8   MCV 81.3 81.6 80.6    210 235     BMP:   Recent Labs     12/05/22  0934 12/05/22  2046 12/06/22  0457    137 136   K 2.8* 3.3* 3.1*    100 101   CO2 29 28 27   PHOS  --  2.6  --    BUN 15 15 17   CREATININE 0.9 1.0 1.0     LIVER PROFILE:   Recent Labs     12/04/22 2105   AST 22   ALT 17   BILITOT 0.6   ALKPHOS 91     Micro:   12/4/2022 SARS-CoV-2 & influenza not detected    Imaging:   CTA 12/4/2022, no comparison  Pulmonary Arteries: The pulmonary arteries are completely under filled. However, no central pulmonary emboli. Mediastinum: No evidence of mediastinal lymphadenopathy. The heart and   pericardium demonstrate no acute abnormality. There is no acute abnormality   of the thoracic aorta. Lungs/pleura: Mild ground-glass opacities in the right upper lobe, left upper   lobe, right lower lobe and left lower lobe. No visualized consolidation       Impression   1. None study is suboptimal.  No central pulmonary emboli. 2. Probable atypical pneumonia.      CT head 12/4/22 negative    Echo pending     ASSESSMENT:  Hemoptysis almost certainly a result of acute pulmonary edema (pink, frothy sputum associated with BP as high as 236/121 and acute substernal chest pain)  Hypertensive emergency  Chest pain   Abnormal CT chest - mild bilateral GGO   Hypokalemia   Uncontrolled HTN, non-compliant with home meds   Bipolar disorder  DM  Never smoker     PLAN:  Nicardipine gtt with goal SBP  140-180, start additional oral agents & titrate cardene to off  Azithromycin/Ceftriaxone D#2 can be discontinued   Echo pending  Prophylaxis: Lovenox and Bactroban can be added   Can be transferred to ICU once off Cardene drip  Needs meds to beds, has plan to establish PCP - emphasized importance of not stopping antihypertensive agents  PCU

## 2022-12-07 LAB
ANION GAP SERPL CALCULATED.3IONS-SCNC: 10 MMOL/L (ref 3–16)
BASOPHILS ABSOLUTE: 0.1 K/UL (ref 0–0.2)
BASOPHILS RELATIVE PERCENT: 1.5 %
BUN BLDV-MCNC: 20 MG/DL (ref 7–20)
CALCIUM SERPL-MCNC: 8.8 MG/DL (ref 8.3–10.6)
CHLORIDE BLD-SCNC: 103 MMOL/L (ref 99–110)
CO2: 24 MMOL/L (ref 21–32)
CREAT SERPL-MCNC: 1 MG/DL (ref 0.9–1.3)
EOSINOPHILS ABSOLUTE: 0.3 K/UL (ref 0–0.6)
EOSINOPHILS RELATIVE PERCENT: 4 %
GFR SERPL CREATININE-BSD FRML MDRD: >60 ML/MIN/{1.73_M2}
GLUCOSE BLD-MCNC: 168 MG/DL (ref 70–99)
HCT VFR BLD CALC: 41.4 % (ref 40.5–52.5)
HEMOGLOBIN: 14.3 G/DL (ref 13.5–17.5)
LYMPHOCYTES ABSOLUTE: 2.1 K/UL (ref 1–5.1)
LYMPHOCYTES RELATIVE PERCENT: 27.1 %
MAGNESIUM: 2.1 MG/DL (ref 1.8–2.4)
MCH RBC QN AUTO: 28.6 PG (ref 26–34)
MCHC RBC AUTO-ENTMCNC: 34.5 G/DL (ref 31–36)
MCV RBC AUTO: 82.9 FL (ref 80–100)
MONOCYTES ABSOLUTE: 0.6 K/UL (ref 0–1.3)
MONOCYTES RELATIVE PERCENT: 7.9 %
NEUTROPHILS ABSOLUTE: 4.5 K/UL (ref 1.7–7.7)
NEUTROPHILS RELATIVE PERCENT: 59.5 %
PDW BLD-RTO: 14.1 % (ref 12.4–15.4)
PLATELET # BLD: 207 K/UL (ref 135–450)
PMV BLD AUTO: 8.5 FL (ref 5–10.5)
POTASSIUM REFLEX MAGNESIUM: 3.4 MMOL/L (ref 3.5–5.1)
RBC # BLD: 5 M/UL (ref 4.2–5.9)
SODIUM BLD-SCNC: 137 MMOL/L (ref 136–145)
WBC # BLD: 7.6 K/UL (ref 4–11)

## 2022-12-07 PROCEDURE — 6360000002 HC RX W HCPCS: Performed by: INTERNAL MEDICINE

## 2022-12-07 PROCEDURE — 99232 SBSQ HOSP IP/OBS MODERATE 35: CPT | Performed by: INTERNAL MEDICINE

## 2022-12-07 PROCEDURE — 6370000000 HC RX 637 (ALT 250 FOR IP): Performed by: INTERNAL MEDICINE

## 2022-12-07 PROCEDURE — 83735 ASSAY OF MAGNESIUM: CPT

## 2022-12-07 PROCEDURE — 85025 COMPLETE CBC W/AUTO DIFF WBC: CPT

## 2022-12-07 PROCEDURE — 2060000000 HC ICU INTERMEDIATE R&B

## 2022-12-07 PROCEDURE — 2580000003 HC RX 258: Performed by: INTERNAL MEDICINE

## 2022-12-07 PROCEDURE — 36415 COLL VENOUS BLD VENIPUNCTURE: CPT

## 2022-12-07 PROCEDURE — 80048 BASIC METABOLIC PNL TOTAL CA: CPT

## 2022-12-07 RX ORDER — LOSARTAN POTASSIUM 25 MG/1
50 TABLET ORAL ONCE
Status: COMPLETED | OUTPATIENT
Start: 2022-12-07 | End: 2022-12-07

## 2022-12-07 RX ORDER — LOSARTAN POTASSIUM 100 MG/1
100 TABLET ORAL DAILY
Status: DISCONTINUED | OUTPATIENT
Start: 2022-12-08 | End: 2022-12-08 | Stop reason: HOSPADM

## 2022-12-07 RX ORDER — POTASSIUM CHLORIDE 750 MG/1
40 TABLET, EXTENDED RELEASE ORAL ONCE
Status: DISCONTINUED | OUTPATIENT
Start: 2022-12-07 | End: 2022-12-08

## 2022-12-07 RX ADMIN — POTASSIUM CHLORIDE 10 MEQ: 7.46 INJECTION, SOLUTION INTRAVENOUS at 12:40

## 2022-12-07 RX ADMIN — POTASSIUM CHLORIDE 10 MEQ: 7.46 INJECTION, SOLUTION INTRAVENOUS at 06:58

## 2022-12-07 RX ADMIN — MUPIROCIN: 20 OINTMENT TOPICAL at 20:12

## 2022-12-07 RX ADMIN — LOSARTAN POTASSIUM 50 MG: 25 TABLET, FILM COATED ORAL at 17:17

## 2022-12-07 RX ADMIN — LOSARTAN POTASSIUM 50 MG: 25 TABLET, FILM COATED ORAL at 08:14

## 2022-12-07 RX ADMIN — METOPROLOL TARTRATE 50 MG: 50 TABLET, FILM COATED ORAL at 08:15

## 2022-12-07 RX ADMIN — POTASSIUM CHLORIDE 10 MEQ: 7.46 INJECTION, SOLUTION INTRAVENOUS at 10:37

## 2022-12-07 RX ADMIN — METOPROLOL TARTRATE 50 MG: 50 TABLET, FILM COATED ORAL at 20:03

## 2022-12-07 RX ADMIN — POTASSIUM CHLORIDE 10 MEQ: 7.46 INJECTION, SOLUTION INTRAVENOUS at 08:03

## 2022-12-07 RX ADMIN — ENOXAPARIN SODIUM 30 MG: 100 INJECTION SUBCUTANEOUS at 20:04

## 2022-12-07 RX ADMIN — HYDRALAZINE HYDROCHLORIDE 10 MG: 20 INJECTION INTRAMUSCULAR; INTRAVENOUS at 03:33

## 2022-12-07 RX ADMIN — POTASSIUM CHLORIDE 10 MEQ: 7.46 INJECTION, SOLUTION INTRAVENOUS at 09:12

## 2022-12-07 RX ADMIN — SODIUM CHLORIDE, PRESERVATIVE FREE 10 ML: 5 INJECTION INTRAVENOUS at 20:04

## 2022-12-07 RX ADMIN — ASPIRIN 81 MG: 81 TABLET, CHEWABLE ORAL at 08:14

## 2022-12-07 NOTE — PLAN OF CARE
Problem: Discharge Planning  Goal: Discharge to home or other facility with appropriate resources  12/7/2022 0828 by Shital Drew RN  Flowsheets (Taken 12/7/2022 [id number])  Discharge to home or other facility with appropriate resources:   Identify barriers to discharge with patient and caregiver   Identify discharge learning needs (meds, wound care, etc)  12/7/2022 0049 by Zuly Love RN  Outcome: Progressing     Problem: Pain  Goal: Verbalizes/displays adequate comfort level or baseline comfort level  12/7/2022 0828 by Shital Drew RN  Flowsheets (Taken 12/7/2022 4274)  Verbalizes/displays adequate comfort level or baseline comfort level: Encourage patient to monitor pain and request assistance  12/7/2022 0049 by Zuly Love RN  Outcome: Progressing     Problem: ABCDS Injury Assessment  Goal: Absence of physical injury  12/7/2022 0049 by Zuly Love RN  Outcome: Progressing

## 2022-12-07 NOTE — FLOWSHEET NOTE
12/06/22 1924   Vital Signs   Temp 97.1 °F (36.2 °C)   Temp Source Oral   Heart Rate 74   Heart Rate Source Monitor   Resp 19   BP (!) 181/95   MAP (Calculated) 124   BP Location Left upper arm   BP Method Manual   Patient Position Sitting   Level of Consciousness 0   MEWS Score 1   Oxygen Therapy   SpO2 99 %   O2 Device None (Room air)     Patient A+Ox4, vitals and assessments done at this time. Bed in lowest position, call light within reach.

## 2022-12-07 NOTE — PROGRESS NOTES
Pulmonary & Critical Care Medicine Progress Note  CC: Hemoptysis    Subjective:   Transferred out of ICU. BP worsened. No new c/o. IV: peripheral    PE:  Blood pressure (!) 174/97, pulse 61, temperature 98.2 °F (36.8 °C), temperature source Oral, resp. rate 18, height 6' 1\" (1.854 m), weight 287 lb 11.2 oz (130.5 kg), SpO2 98 %. on RA  Constitutional:  No acute distress. HENT:  Oropharynx is clear and moist.   Neck: No tracheal deviation present. Cardiovascular: Normal heart sounds. No lower extremity edema. Pulmonary/Chest: No wheezes. No rhonchi. No rales. No decreased breath sounds. No accessory muscle usage or stridor. Musculoskeletal: No cyanosis. No clubbing. Skin: Skin is warm and dry. Psychiatric: Normal mood and affect. Neurologic: speech fluent, alert and oriented, strength symmetric      Scheduled Meds:   mupirocin   Nasal BID    losartan  50 mg Oral Daily    enoxaparin  30 mg SubCUTAneous BID    metoprolol tartrate  50 mg Oral BID    aspirin  81 mg Oral Daily    insulin lispro  0-4 Units SubCUTAneous TID WC    insulin lispro  0-4 Units SubCUTAneous Nightly    sodium chloride flush  5-40 mL IntraVENous 2 times per day     Data:  CBC:   Recent Labs     12/05/22  0934 12/06/22 0457 12/07/22  0439   WBC 7.3 9.2 7.6   HGB 14.8 14.7 14.3   HCT 42.9 41.8 41.4   MCV 81.6 80.6 82.9    235 207     BMP:   Recent Labs     12/05/22 2046 12/06/22 0457 12/07/22  0439    136 137   K 3.3* 3.1* 3.4*    101 103   CO2 28 27 24   PHOS 2.6  --   --    BUN 15 17 20   CREATININE 1.0 1.0 1.0     LIVER PROFILE:   Recent Labs     12/04/22 2105   AST 22   ALT 17   BILITOT 0.6   ALKPHOS 91     Micro:   12/4/2022 SARS-CoV-2 & influenza not detected    Imaging:   CTA 12/4/2022, no comparison  Pulmonary Arteries: The pulmonary arteries are completely under filled. However, no central pulmonary emboli. Mediastinum: No evidence of mediastinal lymphadenopathy.   The heart and   pericardium demonstrate no acute abnormality. There is no acute abnormality   of the thoracic aorta. Lungs/pleura: Mild ground-glass opacities in the right upper lobe, left upper   lobe, right lower lobe and left lower lobe. No visualized consolidation       Impression   1. None study is suboptimal.  No central pulmonary emboli. 2. Probable atypical pneumonia. CT head 12/4/22 negative    Echo 12/5/22:  EF 60-65%. Severe concentric LVH. Grade II diastolic dysfunction with elevated filling pressure. RA mildly dilated. SPAP 35 mmHg. ASSESSMENT:  Hemoptysis almost certainly a result of acute pulmonary edema (pink, frothy sputum associated with BP as high as 236/121 & acute substernal chest pain)  Hypertensive emergency, non-compliant with home meds,   Abnormal CT chest - mild bilateral GGO   Hypokalemia   Grade II diastolic dysfunction, with severe concentric LVH  Bipolar disorder  DM  Never smoker     PLAN:  Off Nicardipine gtt yesterday AM; now Losartan, Metoprolol  Off abx, s/p 2 days Azithromycin/Ceftriaxone    Cardiology has seen, s/p echo. Psychiatry has seen. Needs meds to beds, has plan to establish PCP - emphasized importance of not stopping antihypertensive agents       I spent a total of 10 minutes on this encounter personally obtaining the patient history, reviewing labs, imaging and other data. I independently performed the above physical exam and updated this encounter note, assessment and plan as needed. Edna Douglas MD on 12/7/22 at 12:16 PM EST    I spent a total of 9 minutes on this encounter on chart review, history taking and review of data. I independently performed a physical exam and updated this encounter note as needed.    Vinayak Cordero PA-C on 12/7/22 at 8:04 AM EST

## 2022-12-07 NOTE — FLOWSHEET NOTE
12/07/22 1541   Vitals   Temp 98.1 °F (36.7 °C)   Temp Source Oral   Heart Rate 73   Heart Rate Source Monitor   Resp 18   BP (!) 174/99   MAP (Calculated) 124   BP Location Right upper arm   BP Upper/Lower Upper   BP Method Automatic   Patient Position Semi fowlers   Level of Consciousness 0   MEWS Score 1   Cardiac Rhythm Sinus rhythm   Oxygen Therapy   SpO2 100 %   O2 Device None (Room air)   Patient sitting up in chair. Vitals obtained. Patient denies any needs at this time. Call light within reach.

## 2022-12-07 NOTE — PROGRESS NOTES
Progress Note    Admit Date:  12/4/2022    -pmhx of bipolar disorder, DM, HTN   -presented with hemoptysis and chest pain    -> Patient with hypertensive emergency with resultant acute pulmonary edema and hemoptysis. Transferred to ICU and required nicardipine drip. Blood pressure is now improved and patient has been transferred to PCU    Subjective:  Mr. Albertine Gilford has not had any more hemoptysis. No chest pains or shortness of breath. Systolic blood pressures in the 200s this morning in the 170s now. He is on losartan and metoprolol  Patient denies any complaints and is slowly improving. Objective:   Patient Vitals for the past 4 hrs:   BP Temp Temp src Pulse Resp SpO2   12/07/22 1541 (!) 174/99 98.1 °F (36.7 °C) Oral 73 18 100 %          Intake/Output Summary (Last 24 hours) at 12/7/2022 1628  Last data filed at 12/7/2022 1226  Gross per 24 hour   Intake 1080 ml   Output --   Net 1080 ml       Physical Exam:    Gen: No distress. Alert. Pleasant male . Awake and well-oriented  Eyes: PERRL. No sclera icterus. No conjunctival injection. Neck: No JVD. Trachea midline. Resp: No accessory muscle use. Clear to auscultation. no crackles. No wheezes. No rhonchi. CV: Regular rate. Regular rhythm. No murmur. No rub. No edema. Peripheral Pulses: +2 palpable, equal bilaterally   GI: Non-tender. Non-distended. Normal bowel sounds. Skin: Warm and dry. No nodule on exposed extremities. No rash on exposed extremities. M/S: No cyanosis. No joint deformity. No clubbing. Neuro: Awake. Grossly nonfocal    Psych: Oriented x 3. No anxiety or agitation.          Medications:  mupirocin, , BID  losartan, 50 mg, Daily  enoxaparin, 30 mg, BID  metoprolol tartrate, 50 mg, BID  aspirin, 81 mg, Daily  insulin lispro, 0-4 Units, TID WC  insulin lispro, 0-4 Units, Nightly  sodium chloride flush, 5-40 mL, 2 times per day    PRN Medications:  perflutren lipid microspheres, 1.5 mL, ONCE PRN  magnesium sulfate, 2,000 mg, PRN  labetalol, 20 mg, Q4H PRN  potassium chloride, 20 mEq, PRN   Or  potassium chloride, 10 mEq, PRN  glucose, 4 tablet, PRN  dextrose bolus, 125 mL, PRN   Or  dextrose bolus, 250 mL, PRN  glucagon (rDNA), 1 mg, PRN  dextrose, , Continuous PRN  hydrALAZINE, 10 mg, Q6H PRN  sodium chloride flush, 5-40 mL, PRN  sodium chloride, , PRN  ondansetron, 4 mg, Q8H PRN   Or  ondansetron, 4 mg, Q6H PRN  polyethylene glycol, 17 g, Daily PRN  acetaminophen, 650 mg, Q6H PRN   Or  acetaminophen, 650 mg, Q6H PRN        Data:  CBC:   Recent Labs     12/05/22  0934 12/06/22 0457 12/07/22  0439   WBC 7.3 9.2 7.6   HGB 14.8 14.7 14.3   HCT 42.9 41.8 41.4   MCV 81.6 80.6 82.9    235 207     BMP:   Recent Labs     12/05/22 2046 12/06/22 0457 12/07/22 0439    136 137   K 3.3* 3.1* 3.4*    101 103   CO2 28 27 24   PHOS 2.6  --   --    BUN 15 17 20   CREATININE 1.0 1.0 1.0     LIVER PROFILE:   Recent Labs     12/04/22  2105   AST 22   ALT 17   BILITOT 0.6   ALKPHOS 91     PT/INR: No results for input(s): PROTIME, INR in the last 72 hours. CULTURES  COVID and Flu not detected      RADIOLOGY  CT CHEST PULMONARY EMBOLISM W CONTRAST   Final Result   1. None study is suboptimal.  No central pulmonary emboli. 2. Probable atypical pneumonia. CT HEAD WO CONTRAST   Final Result   No acute intracranial abnormality. ECHO  Left ventricular systolic function is normal with ejection fraction   estimated at 60-65%. No regional wall motion abnormalities are noted. There is severe concentric left ventricular hypertrophy. Grade II diastolic dysfunction with elevated filling pressure. The right atrium is mildly dilated. Mild posterior mitral annular calcification is present. Systolic pulmonary artery pressure (SPAP) estimated at 35 mmHg (RA pressure   3 mmHg).     Assessment/Plan:  #Hemoptysis -likely result of acute pulmonary edema with blood pressure as high as 230/120  #Chest pain   -CTA chest above with no PE   -Pneumonia ruled out  -procalc neg, no WBC, afebrile  -Was on Rocephin and Zithromax this has been discontinued.  -trops 0.01 x3   -has not had any further episodes today and CP has resolved   -Seen in consultation by pulmonology and cardiology . #Hypertensive urgency   -noncompliant with home regimen   - Cardiology consult. - No stress test planned for now. - Echocardiogram shows diastolic dysfunction  -Patient initially treated with IV Lopressor and as needed hydralazine; persistent high blood pressures transferred to ICU and placed on nicardipine drip. Off nicardipine drip now  - continue to monitor BP, has been slowly down trending   - Continue metoprolol BID. Losartan 50 mg added. - Blood pressure still running high today;  I will increase dose of losartan to 100 mg daily starting tomorrow;  give extra dose of losartan this evening .  - monitor blood pressure and heart rate  - Hydralazine and labetalol prn ordered    #DM type 2   -holding oral metformin   -SSI   -Patient blood sugars have been stable and he is getting very anxious with blood sugar checks#. [facility] will stop them     #Bipolar disorder   -with manic behavior   -no home medication regimen  -psychiatry consulted  Patient does not want to start any new medications right now    # Hypokalemia  - replete    DVT Prophylaxis: SCDs  Diet: ADULT DIET; Regular; 4 carb choices (60 gm/meal)  Code Status: Full Code    Patient has improved today. Continue to monitor blood pressures overnight  Likely DC plans for tomorrow on metoprolol twice daily and losartan 100 mg daily.    medications can be arranged through meds to beds    Lynette Gutiérrez MD  12/07/22

## 2022-12-07 NOTE — FLOWSHEET NOTE
12/07/22 0807   Vitals   Temp 96.8 °F (36 °C)   Temp Source Oral   Heart Rate 73   Heart Rate Source Monitor   Resp 18   BP (!) 209/124   MAP (Calculated) 152   BP Location Right upper arm   BP Upper/Lower Upper   BP Method Automatic   Patient Position Semi fowlers   Level of Consciousness 0   MEWS Score 3   Cardiac Rhythm Sinus rhythm   Oxygen Therapy   SpO2 100 %   Pulse Oximetry Type Intermittent   Pulse Oximeter Device Mode Intermittent   Pulse Oximeter Device Location Left;Finger   O2 Device None (Room air)   Shift assessment completed-see flow sheet. Patient in bed awake and alert. Pt. Concepcion Arzola with all the needle poking that is being done, that is on reason his BP is elevated. Notified Pt. That no more labs should need to be drawn for a while. Lovenox injection held at this time per pt. Morning medications given, /1240 Metoprolol and Losartan given per order. Other vitals stable. Lung sounds clear, HR 73, sinus on monitor. Patient denies any further needs at this time,call light within reach.

## 2022-12-07 NOTE — PROGRESS NOTES
Bedside report and transfer of care given to   Serene Garay RN. Pt currently resting in bed with the call light within reach. Pt denies any other care needs at this time. Pt stable at this time.       Feliciano , RN

## 2022-12-08 VITALS
HEART RATE: 68 BPM | DIASTOLIC BLOOD PRESSURE: 96 MMHG | RESPIRATION RATE: 18 BRPM | WEIGHT: 285.7 LBS | BODY MASS INDEX: 37.86 KG/M2 | OXYGEN SATURATION: 95 % | TEMPERATURE: 97.7 F | SYSTOLIC BLOOD PRESSURE: 173 MMHG | HEIGHT: 73 IN

## 2022-12-08 LAB
ANION GAP SERPL CALCULATED.3IONS-SCNC: 11 MMOL/L (ref 3–16)
BASOPHILS ABSOLUTE: 0.1 K/UL (ref 0–0.2)
BASOPHILS RELATIVE PERCENT: 1.2 %
BUN BLDV-MCNC: 27 MG/DL (ref 7–20)
CALCIUM SERPL-MCNC: 8.6 MG/DL (ref 8.3–10.6)
CHLORIDE BLD-SCNC: 103 MMOL/L (ref 99–110)
CO2: 24 MMOL/L (ref 21–32)
CREAT SERPL-MCNC: 1.2 MG/DL (ref 0.9–1.3)
EOSINOPHILS ABSOLUTE: 0.2 K/UL (ref 0–0.6)
EOSINOPHILS RELATIVE PERCENT: 2.6 %
GFR SERPL CREATININE-BSD FRML MDRD: >60 ML/MIN/{1.73_M2}
GLUCOSE BLD-MCNC: 116 MG/DL (ref 70–99)
HCT VFR BLD CALC: 41.4 % (ref 40.5–52.5)
HEMOGLOBIN: 14.2 G/DL (ref 13.5–17.5)
LYMPHOCYTES ABSOLUTE: 2.1 K/UL (ref 1–5.1)
LYMPHOCYTES RELATIVE PERCENT: 24.3 %
MAGNESIUM: 2 MG/DL (ref 1.8–2.4)
MCH RBC QN AUTO: 28.7 PG (ref 26–34)
MCHC RBC AUTO-ENTMCNC: 34.4 G/DL (ref 31–36)
MCV RBC AUTO: 83.5 FL (ref 80–100)
MONOCYTES ABSOLUTE: 0.6 K/UL (ref 0–1.3)
MONOCYTES RELATIVE PERCENT: 7.4 %
NEUTROPHILS ABSOLUTE: 5.6 K/UL (ref 1.7–7.7)
NEUTROPHILS RELATIVE PERCENT: 64.5 %
PDW BLD-RTO: 14.1 % (ref 12.4–15.4)
PLATELET # BLD: 213 K/UL (ref 135–450)
PMV BLD AUTO: 8.4 FL (ref 5–10.5)
POTASSIUM REFLEX MAGNESIUM: 3.2 MMOL/L (ref 3.5–5.1)
RBC # BLD: 4.95 M/UL (ref 4.2–5.9)
SODIUM BLD-SCNC: 138 MMOL/L (ref 136–145)
WBC # BLD: 8.6 K/UL (ref 4–11)

## 2022-12-08 PROCEDURE — 85025 COMPLETE CBC W/AUTO DIFF WBC: CPT

## 2022-12-08 PROCEDURE — 83735 ASSAY OF MAGNESIUM: CPT

## 2022-12-08 PROCEDURE — 6360000002 HC RX W HCPCS: Performed by: INTERNAL MEDICINE

## 2022-12-08 PROCEDURE — 6370000000 HC RX 637 (ALT 250 FOR IP): Performed by: INTERNAL MEDICINE

## 2022-12-08 PROCEDURE — 99232 SBSQ HOSP IP/OBS MODERATE 35: CPT

## 2022-12-08 PROCEDURE — 84244 ASSAY OF RENIN: CPT

## 2022-12-08 PROCEDURE — 80048 BASIC METABOLIC PNL TOTAL CA: CPT

## 2022-12-08 PROCEDURE — 36415 COLL VENOUS BLD VENIPUNCTURE: CPT

## 2022-12-08 PROCEDURE — 6360000002 HC RX W HCPCS

## 2022-12-08 PROCEDURE — 6370000000 HC RX 637 (ALT 250 FOR IP)

## 2022-12-08 PROCEDURE — 82088 ASSAY OF ALDOSTERONE: CPT

## 2022-12-08 RX ORDER — NIFEDIPINE 30 MG/1
30 TABLET, EXTENDED RELEASE ORAL 2 TIMES DAILY
Status: DISCONTINUED | OUTPATIENT
Start: 2022-12-08 | End: 2022-12-08 | Stop reason: HOSPADM

## 2022-12-08 RX ORDER — HYDRALAZINE HYDROCHLORIDE 20 MG/ML
10 INJECTION INTRAMUSCULAR; INTRAVENOUS EVERY 6 HOURS PRN
Status: DISCONTINUED | OUTPATIENT
Start: 2022-12-08 | End: 2022-12-08 | Stop reason: HOSPADM

## 2022-12-08 RX ORDER — AMLODIPINE BESYLATE 5 MG/1
10 TABLET ORAL DAILY
Status: DISCONTINUED | OUTPATIENT
Start: 2022-12-08 | End: 2022-12-08

## 2022-12-08 RX ORDER — HYDRALAZINE HYDROCHLORIDE 25 MG/1
25 TABLET, FILM COATED ORAL EVERY 8 HOURS SCHEDULED
Status: DISCONTINUED | OUTPATIENT
Start: 2022-12-08 | End: 2022-12-08 | Stop reason: HOSPADM

## 2022-12-08 RX ADMIN — HYDRALAZINE HYDROCHLORIDE 10 MG: 20 INJECTION INTRAMUSCULAR; INTRAVENOUS at 14:21

## 2022-12-08 RX ADMIN — POTASSIUM CHLORIDE 10 MEQ: 7.46 INJECTION, SOLUTION INTRAVENOUS at 10:34

## 2022-12-08 RX ADMIN — POTASSIUM CHLORIDE 10 MEQ: 7.46 INJECTION, SOLUTION INTRAVENOUS at 14:17

## 2022-12-08 RX ADMIN — POTASSIUM CHLORIDE 10 MEQ: 7.46 INJECTION, SOLUTION INTRAVENOUS at 12:21

## 2022-12-08 RX ADMIN — METOPROLOL TARTRATE 50 MG: 50 TABLET, FILM COATED ORAL at 08:52

## 2022-12-08 RX ADMIN — NIFEDIPINE 30 MG: 30 TABLET, FILM COATED, EXTENDED RELEASE ORAL at 12:19

## 2022-12-08 RX ADMIN — LOSARTAN POTASSIUM 100 MG: 100 TABLET, FILM COATED ORAL at 08:52

## 2022-12-08 RX ADMIN — HYDRALAZINE HYDROCHLORIDE 25 MG: 25 TABLET, FILM COATED ORAL at 16:01

## 2022-12-08 RX ADMIN — HYDRALAZINE HYDROCHLORIDE 10 MG: 20 INJECTION INTRAMUSCULAR; INTRAVENOUS at 03:29

## 2022-12-08 RX ADMIN — ASPIRIN 81 MG: 81 TABLET, CHEWABLE ORAL at 08:52

## 2022-12-08 RX ADMIN — POTASSIUM CHLORIDE 10 MEQ: 7.46 INJECTION, SOLUTION INTRAVENOUS at 09:00

## 2022-12-08 NOTE — PLAN OF CARE
Problem: ABCDS Injury Assessment  Goal: Absence of physical injury  Outcome: Not Progressing     Problem: Discharge Planning  Goal: Discharge to home or other facility with appropriate resources  Outcome: Progressing     Problem: Pain  Goal: Verbalizes/displays adequate comfort level or baseline comfort level  Outcome: Progressing     Problem: ABCDS Injury Assessment  Goal: Absence of physical injury  Outcome: Not Progressing

## 2022-12-08 NOTE — PLAN OF CARE
Problem: Discharge Planning  Goal: Discharge to home or other facility with appropriate resources  12/8/2022 0633 by Prema Cisneros RN  Outcome: Progressing     Problem: Pain  Goal: Verbalizes/displays adequate comfort level or baseline comfort level  12/8/2022 1253 by Sienna Amor RN  Flowsheets (Taken 12/8/2022 1253)  Verbalizes/displays adequate comfort level or baseline comfort level:   Encourage patient to monitor pain and request assistance   Assess pain using appropriate pain scale  12/8/2022 0633 by Prema Cisneros RN  Outcome: Progressing     Problem: ABCDS Injury Assessment  Goal: Absence of physical injury  12/8/2022 1253 by Sienna Amor RN  Outcome: Progressing  12/8/2022 0633 by Prema Cisneros RN  Outcome: Not Progressing     Problem: ABCDS Injury Assessment  Goal: Absence of physical injury  12/8/2022 1253 by Sienna Amor RN  Outcome: Progressing  12/8/2022 0633 by Prema Cisneros RN  Outcome: Not Progressing

## 2022-12-08 NOTE — PROGRESS NOTES
Progress Note    Admit Date:  12/4/2022    --pmhx of bipolar disorder, DM, HTN   -presented with hemoptysis and chest pain     -> Patient with hypertensive emergency with resultant acute pulmonary edema and hemoptysis. Transferred to ICU and required nicardipine drip. Blood pressure is now improved and patient has been transferred to PCU    12/8/2022    -BP has still remained elevated after transitioning to PO, added a 3rd agent, will attempt to wean off IV     Subjective:  Mr. Jenifer Phipps today is feeling fine. He is just very irritated about his BP not being controlled. Still asymptomatic. Objective:   Patient Vitals for the past 4 hrs:   BP Temp Temp src Pulse Resp SpO2   12/08/22 0852 (!) 199/95 -- -- -- -- --   12/08/22 0850 (!) 199/95 97.7 °F (36.5 °C) Oral 68 18 95 %   12/08/22 0730 (!) 163/73 -- -- -- -- --          Intake/Output Summary (Last 24 hours) at 12/8/2022 1048  Last data filed at 12/8/2022 0855  Gross per 24 hour   Intake 360 ml   Output --   Net 360 ml       Physical Exam:    Gen: No distress. Alert. Young male   Eyes: PERRL. No sclera icterus. No conjunctival injection. Neck: No JVD. Trachea midline. Resp: No accessory muscle use. No crackles. No wheezes. No rhonchi. CV: Regular rate. Regular rhythm. No murmur. No rub. No edema. Peripheral Pulses: +2 palpable, equal bilaterally   GI: Non-tender. Non-distended. Normal bowel sounds. Skin: Warm and dry. No nodule on exposed extremities. No rash on exposed extremities. M/S: No cyanosis. No joint deformity. No clubbing. Neuro: Awake. Grossly nonfocal    Psych: Oriented x 3. No anxiety ++agitation.          Medications:  losartan, 100 mg, Daily  potassium chloride, 40 mEq, Once  mupirocin, , BID  enoxaparin, 30 mg, BID  metoprolol tartrate, 50 mg, BID  aspirin, 81 mg, Daily  sodium chloride flush, 5-40 mL, 2 times per day      PRN Medications:  perflutren lipid microspheres, 1.5 mL, ONCE PRN  magnesium sulfate, 2,000 mg, PRN  labetalol, 20 mg, Q4H PRN  potassium chloride, 20 mEq, PRN   Or  potassium chloride, 10 mEq, PRN  glucose, 4 tablet, PRN  dextrose bolus, 125 mL, PRN   Or  dextrose bolus, 250 mL, PRN  glucagon (rDNA), 1 mg, PRN  dextrose, , Continuous PRN  hydrALAZINE, 10 mg, Q6H PRN  sodium chloride flush, 5-40 mL, PRN  sodium chloride, , PRN  ondansetron, 4 mg, Q8H PRN   Or  ondansetron, 4 mg, Q6H PRN  polyethylene glycol, 17 g, Daily PRN  acetaminophen, 650 mg, Q6H PRN   Or  acetaminophen, 650 mg, Q6H PRN          Data:  CBC:   Recent Labs     12/06/22 0457 12/07/22 0439 12/08/22 0459   WBC 9.2 7.6 8.6   HGB 14.7 14.3 14.2   HCT 41.8 41.4 41.4   MCV 80.6 82.9 83.5    207 213     BMP:   Recent Labs     12/05/22 2046 12/06/22 0457 12/07/22 0439 12/08/22 0459    136 137 138   K 3.3* 3.1* 3.4* 3.2*    101 103 103   CO2 28 27 24 24   PHOS 2.6  --   --   --    BUN 15 17 20 27*   CREATININE 1.0 1.0 1.0 1.2         CULTURES  COVID and Flu not detected      RADIOLOGY  CT CHEST PULMONARY EMBOLISM W CONTRAST   Final Result   1. None study is suboptimal.  No central pulmonary emboli. 2. Probable atypical pneumonia. CT HEAD WO CONTRAST   Final Result   No acute intracranial abnormality. Assessment/Plan:  #Hemoptysis -likely result of acute pulmonary edema with blood pressure as high as 230/120  #Chest pain   -CTA chest above with no PE   -Pneumonia ruled out  -procalc neg, no WBC, afebrile  -Was on Rocephin and Zithromax this has been discontinued.  -trops 0.01 x3   -has not had any further episodes today and CP has resolved   -Seen in consultation by pulmonology and cardiology. #Hypertensive urgency   -noncompliant with home regimen   - Cardiology consult. - No stress test planned for now.   - Echocardiogram shows diastolic dysfunction  -Patient initially treated with IV Lopressor and as needed hydralazine; persistent high blood pressures transferred to ICU and placed on nicardipine drip. Off nicardipine drip now  - continue to monitor BP, has been slowly down trending   - Continue metoprolol BID. Losartan 50 mg added. - Blood pressure still running high today;  I will   -losartan increased to 100 mg daily   - monitor blood pressure and heart rate  -added nifedipine 30 BID   -would like to discontinue prn IV BP meds     #Chronic diastolic HF   -grade II   -no s/s of decompensation      #DM type 2   -holding oral metformin   -SSI   -Patient blood sugars have been stable and he is getting very anxious with blood sugar checks#. [facility] will stop them      #Bipolar disorder   -with manic behavior   -no home medication regimen  -psychiatry consulted  Patient does not want to start any new medications right now     # Hypokalemia  - replete     Patient has improved today. Continue to monitor blood pressures overnight  Likely DC plans for tomorrow on metoprolol twice daily and losartan 100 mg daily. medications can be arranged through meds to beds    12/8/22    Patient still with elevated BP readings, even up into the 200/100s  -need to wean off IV prn prior to discharge     DVT Prophylaxis: Lovenox   Diet: ADULT DIET;  Regular; 4 carb choices (60 gm/meal)  Code Status: Full Code    CHIQUI Thomas  12/08/22  12:03 PM

## 2022-12-08 NOTE — FLOWSHEET NOTE
12/08/22 0850   Vitals   Temp 97.7 °F (36.5 °C)   Temp Source Oral   Heart Rate 68   Heart Rate Source Monitor   Resp 18   BP (!) 199/95   MAP (Calculated) 130   BP Location Left upper arm   BP Upper/Lower Upper   BP Method Automatic   Level of Consciousness 0   MEWS Score 1   Cardiac Rhythm Sinus rhythm   Oxygen Therapy   SpO2 95 %   O2 Device None (Room air)   Shift assessment completed-see flow sheet. Patient in bed awake, alert and oriented x4. Vitals obtained. Patient upset that BP is still elevated, Morning BP medications given per order. Notified MD BP still elevated and patient received IV hydralazine overnight. Morning medications given per order  Patient denies any further needs at this time,call light within reach.

## 2022-12-08 NOTE — FLOWSHEET NOTE
12/07/22 1938   Vital Signs   Temp 98.4 °F (36.9 °C)   Temp Source Oral   Heart Rate 67   Heart Rate Source Monitor   Resp 18   BP (!) 197/61   MAP (Calculated) 106   BP Location Right upper arm   BP Method Automatic   Patient Position Semi fowlers   Oxygen Therapy   SpO2 98 %   O2 Device None (Room air)       Assessment done. Patient is alert and oriented. With elevated blood pressure, patient is asymptomatic. Call light within reach.

## 2022-12-08 NOTE — FLOWSHEET NOTE
12/08/22 0300   Vital Signs   Temp 99.2 °F (37.3 °C)   Temp Source Axillary   Heart Rate 62   Heart Rate Source Monitor   BP (!) 216/97   MAP (Calculated) 137   MAP (mmHg) 127   BP Location Left upper arm   BP Method Automatic   Patient Position Sitting   Level of Consciousness 0   Oxygen Therapy   SpO2 95 %   O2 Device None (Room air)   Height and Weight   Weight 285 lb 11.2 oz (129.6 kg)   Weight Method Standing scale   BMI (Calculated) 37.8     Patient is asymptomatic with elevated BP. Hydralazine 10mg given as ordered. Encouraged to have some sleep. Dim light, close door. Call light within reach.

## 2022-12-10 LAB — ALDOSTERONE: 23.8 NG/DL

## 2022-12-10 NOTE — ED PROVIDER NOTES
Emergency Department Encounter    Patient: Francie Choudhary  MRN: [id number]  : 1980  Date of Evaluation: 12/10/2022  ED Provider:  Jennie Jason MD    Triage Chief Complaint:   Cough (2-2.5 hours. Coughing up \"pinky foamy stuff\".) and Dizziness (X2.5-3 hours)    [place]:  Francie Choudhary is a 35 y.o. male presenting with complaints of hemoptysis. Patient states for the past 2 to 3 hours he has had cough and coughing up blood. States she has bright red streaks of blood. Patient shows me what he is coughing up and the total volume is small but it is about 50% blood that is being expectorated. Patient denies any chest pain or shortness of breath. Denies fevers or chills. Denies recent falls or trauma. States he did have some lightheadedness started this morning as well. Denies any room spinning dizziness or vertigo. States he does have a mild frontal headache without blurred vision, focal neurodeficits, motor or sensory changes. Denies neck or back pain. Denies abdominal pain, nausea vomiting, diarrhea constipation or urinary symptoms. ROS - see HPI, below listed is current ROS at time of my eval:  At least 14 systems reviewed, negative other HPI    Past Medical History:   Diagnosis Date    ADHD     Bipolar 1 disorder ([facility] [address]     Diabetes mellitus ([facility] [address]     Hypertension      Past Surgical History:   Procedure Laterality Date    ANKLE SURGERY Left      History reviewed. No pertinent family history.   Social History     Socioeconomic History    Marital status:      Spouse name: Not on file    Number of children: Not on file    Years of education: Not on file    Highest education level: Not on file   Occupational History    Not on file   Tobacco Use    Smoking status: Never    Smokeless tobacco: Never   Substance and Sexual Activity    Alcohol use: Not Currently     Comment: rarely    Drug use: Never    Sexual activity: Not on file   Other Topics Concern    Not on file   Social History Narrative Not on file     Social Determinants of Health     Financial Resource Strain: Not on file   Food Insecurity: Not on file   Transportation Needs: Not on file   Physical Activity: Not on file   Stress: Not on file   Social Connections: Not on file   Intimate Partner Violence: Not on file   Housing Stability: Not on file     No current facility-administered medications for this encounter. Current Outpatient Medications   Medication Sig Dispense Refill    aspirin 81 MG chewable tablet Take 81 mg by mouth daily (Patient not taking: Reported on 12/4/2022)      lisinopril-hydroCHLOROthiazide (PRINZIDE;ZESTORETIC) 20-25 MG per tablet Take 1 tablet by mouth daily (Patient not taking: No sig reported) 30 tablet 0    metFORMIN (GLUCOPHAGE) 500 MG tablet Take 1 tablet by mouth 2 times daily (with meals) (Patient not taking: Reported on 12/4/2022) 60 tablet 0     Allergies   Allergen Reactions    Lisinopril-Hydrochlorothiazide Other (See Comments)     \"Rapid heart rate\"      Other Other (See Comments)     Unknown blood pressure pill that caused his heart rate to raise. Nursing Notes Reviewed    Physical Exam:  Triage VS:    ED Triage Vitals   Enc Vitals Group      BP 12/04/22 2009 (!) 208/119      Heart Rate 12/04/22 2009 79      Resp 12/04/22 2009 20      Temp 12/04/22 2009 98.9 °F (37.2 °C)      Temp Source 12/04/22 2009 Oral      SpO2 12/04/22 2009 93 %      Weight 12/05/22 1130 282 lb 1.6 oz (128 kg)      Height 12/04/22 2010 6' 1\" (1.854 m)      Head Circumference --       Peak Flow --       Pain Score --       Pain Loc --       Pain Edu? --       Excl. in [address]? --        My pulse ox interpretation is - normal    General appearance:  No acute distress. Skin:  Warm. Dry. Eye:  Extraocular movements intact. Ears, nose, mouth and throat:  Oral mucosa moist   Neck:  Trachea midline. Extremity:  No swelling. Normal ROM     Heart:  Regular rate and rhythm, normal S1 & S2, no extra heart sounds.     Perfusion: intact  Respiratory:  Lungs clear to auscultation bilaterally. Respirations nonlabored. Abdominal:  Normal bowel sounds. Soft. Nontender. Non distended. Back:  No CVA tenderness to palpation     Neurological:  Alert and oriented times 3. No focal neuro deficits.              Psychiatric:  Appropriate    I have reviewed and interpreted all of the currently available lab results from this visit (if applicable):  Results for orders placed or performed during the hospital encounter of 12/04/22   COVID-19 & Influenza Combo    Specimen: Nasopharyngeal Swab   Result Value Ref Range    SARS-CoV-2 RNA, RT PCR NOT DETECTED NOT DETECTED    INFLUENZA A NOT DETECTED NOT DETECTED    INFLUENZA B NOT DETECTED NOT DETECTED   CBC with Auto Differential   Result Value Ref Range    WBC 8.8 4.0 - 11.0 K/uL    RBC 5.27 4.20 - 5.90 M/uL    Hemoglobin 15.0 13.5 - 17.5 g/dL    Hematocrit 42.9 40.5 - 52.5 %    MCV 81.3 80.0 - 100.0 fL    MCH 28.4 26.0 - 34.0 pg    MCHC 34.9 31.0 - 36.0 g/dL    RDW 14.2 12.4 - 15.4 %    Platelets 490 925 - 630 K/uL    MPV 8.4 5.0 - 10.5 fL    Neutrophils % 68.3 %    Lymphocytes % 20.6 %    Monocytes % 7.9 %    Eosinophils % 2.1 %    Basophils % 1.1 %    Neutrophils Absolute 6.0 1.7 - 7.7 K/uL    Lymphocytes Absolute 1.8 1.0 - 5.1 K/uL    Monocytes Absolute 0.7 0.0 - 1.3 K/uL    Eosinophils Absolute 0.2 0.0 - 0.6 K/uL    Basophils Absolute 0.1 0.0 - 0.2 K/uL   Comprehensive Metabolic Panel w/ Reflex to MG   Result Value Ref Range    Sodium 140 136 - 145 mmol/L    Potassium reflex Magnesium 3.3 (L) 3.5 - 5.1 mmol/L    Chloride 103 99 - 110 mmol/L    CO2 27 21 - 32 mmol/L    Anion Gap 10 3 - 16    Glucose 113 (H) 70 - 99 mg/dL    BUN 20 7 - 20 mg/dL    Creatinine 0.9 0.9 - 1.3 mg/dL    Est, Glom Filt Rate >60 >60    Calcium 8.7 8.3 - 10.6 mg/dL    Total Protein 7.1 6.4 - 8.2 g/dL    Albumin 4.3 3.4 - 5.0 g/dL    Albumin/Globulin Ratio 1.5 1.1 - 2.2    Total Bilirubin 0.6 0.0 - 1.0 mg/dL    Alkaline Phosphatase 91 40 - 129 U/L    ALT 17 10 - 40 U/L    AST 22 15 - 37 U/L   Troponin   Result Value Ref Range    Troponin <0.01 <0.01 ng/mL   Brain Natriuretic Peptide   Result Value Ref Range    Pro- (H) 0 - 124 pg/mL   Magnesium   Result Value Ref Range    Magnesium 1.80 1.80 - 2.40 mg/dL   Basic Metabolic Panel w/ Reflex to MG   Result Value Ref Range    Sodium 140 136 - 145 mmol/L    Potassium reflex Magnesium 2.8 (LL) 3.5 - 5.1 mmol/L    Chloride 101 99 - 110 mmol/L    CO2 29 21 - 32 mmol/L    Anion Gap 10 3 - 16    Glucose 168 (H) 70 - 99 mg/dL    BUN 15 7 - 20 mg/dL    Creatinine 0.9 0.9 - 1.3 mg/dL    Est, Glom Filt Rate >60 >60    Calcium 9.7 8.3 - 10.6 mg/dL   CBC with Auto Differential   Result Value Ref Range    WBC 7.3 4.0 - 11.0 K/uL    RBC 5.26 4.20 - 5.90 M/uL    Hemoglobin 14.8 13.5 - 17.5 g/dL    Hematocrit 42.9 40.5 - 52.5 %    MCV 81.6 80.0 - 100.0 fL    MCH 28.2 26.0 - 34.0 pg    MCHC 34.5 31.0 - 36.0 g/dL    RDW 14.2 12.4 - 15.4 %    Platelets 632 028 - 631 K/uL    MPV 9.2 5.0 - 10.5 fL    Neutrophils % 71.4 %    Lymphocytes % 17.8 %    Monocytes % 7.5 %    Eosinophils % 2.1 %    Basophils % 1.2 %    Neutrophils Absolute 5.2 1.7 - 7.7 K/uL    Lymphocytes Absolute 1.3 1.0 - 5.1 K/uL    Monocytes Absolute 0.5 0.0 - 1.3 K/uL    Eosinophils Absolute 0.2 0.0 - 0.6 K/uL    Basophils Absolute 0.1 0.0 - 0.2 K/uL   Procalcitonin   Result Value Ref Range    Procalcitonin 0.09 0.00 - 0.15 ng/mL   Hemoglobin A1c   Result Value Ref Range    Hemoglobin A1C 5.6 See comment %    eAG 114.0 mg/dL   Troponin   Result Value Ref Range    Troponin <0.01 <0.01 ng/mL   C-Reactive Protein   Result Value Ref Range    CRP 5.8 (H) 0.0 - 5.1 mg/L   Lipid Panel   Result Value Ref Range    Cholesterol, Total 183 0 - 199 mg/dL    Triglycerides 157 (H) 0 - 150 mg/dL    HDL 29 (L) 40 - 60 mg/dL    LDL Calculated 123 (H) <100 mg/dL    VLDL Cholesterol Calculated 31 Not Established mg/dL   Magnesium   Result Value Ref Range Magnesium 1.80 1.80 - 2.40 mg/dL   Troponin   Result Value Ref Range    Troponin <0.01 <0.01 ng/mL   Renal Function Panel   Result Value Ref Range    Sodium 137 136 - 145 mmol/L    Potassium 3.3 (L) 3.5 - 5.1 mmol/L    Chloride 100 99 - 110 mmol/L    CO2 28 21 - 32 mmol/L    Anion Gap 9 3 - 16    Glucose 119 (H) 70 - 99 mg/dL    BUN 15 7 - 20 mg/dL    Creatinine 1.0 0.9 - 1.3 mg/dL    Est, Glom Filt Rate >60 >60    Calcium 9.1 8.3 - 10.6 mg/dL    Phosphorus 2.6 2.5 - 4.9 mg/dL    Albumin 4.4 3.4 - 5.0 g/dL   CBC with Auto Differential   Result Value Ref Range    WBC 9.2 4.0 - 11.0 K/uL    RBC 5.18 4.20 - 5.90 M/uL    Hemoglobin 14.7 13.5 - 17.5 g/dL    Hematocrit 41.8 40.5 - 52.5 %    MCV 80.6 80.0 - 100.0 fL    MCH 28.5 26.0 - 34.0 pg    MCHC 35.3 31.0 - 36.0 g/dL    RDW 14.0 12.4 - 15.4 %    Platelets 826 714 - 230 K/uL    MPV 8.7 5.0 - 10.5 fL    Neutrophils % 64.0 %    Lymphocytes % 22.5 %    Monocytes % 9.3 %    Eosinophils % 2.7 %    Basophils % 1.5 %    Neutrophils Absolute 5.9 1.7 - 7.7 K/uL    Lymphocytes Absolute 2.1 1.0 - 5.1 K/uL    Monocytes Absolute 0.9 0.0 - 1.3 K/uL    Eosinophils Absolute 0.3 0.0 - 0.6 K/uL    Basophils Absolute 0.1 0.0 - 0.2 K/uL   Basic Metabolic Panel w/ Reflex to MG   Result Value Ref Range    Sodium 136 136 - 145 mmol/L    Potassium reflex Magnesium 3.1 (L) 3.5 - 5.1 mmol/L    Chloride 101 99 - 110 mmol/L    CO2 27 21 - 32 mmol/L    Anion Gap 8 3 - 16    Glucose 153 (H) 70 - 99 mg/dL    BUN 17 7 - 20 mg/dL    Creatinine 1.0 0.9 - 1.3 mg/dL    Est, Glom Filt Rate >60 >60    Calcium 8.7 8.3 - 10.6 mg/dL   Magnesium   Result Value Ref Range    Magnesium 2.30 1.80 - 2.40 mg/dL   CBC with Auto Differential   Result Value Ref Range    WBC 7.6 4.0 - 11.0 K/uL    RBC 5.00 4.20 - 5.90 M/uL    Hemoglobin 14.3 13.5 - 17.5 g/dL    Hematocrit 41.4 40.5 - 52.5 %    MCV 82.9 80.0 - 100.0 fL    MCH 28.6 26.0 - 34.0 pg    MCHC 34.5 31.0 - 36.0 g/dL    RDW 14.1 12.4 - 15.4 %    Platelets 812 135 - 450 K/uL    MPV 8.5 5.0 - 10.5 fL    Neutrophils % 59.5 %    Lymphocytes % 27.1 %    Monocytes % 7.9 %    Eosinophils % 4.0 %    Basophils % 1.5 %    Neutrophils Absolute 4.5 1.7 - 7.7 K/uL    Lymphocytes Absolute 2.1 1.0 - 5.1 K/uL    Monocytes Absolute 0.6 0.0 - 1.3 K/uL    Eosinophils Absolute 0.3 0.0 - 0.6 K/uL    Basophils Absolute 0.1 0.0 - 0.2 K/uL   Basic Metabolic Panel w/ Reflex to MG   Result Value Ref Range    Sodium 137 136 - 145 mmol/L    Potassium reflex Magnesium 3.4 (L) 3.5 - 5.1 mmol/L    Chloride 103 99 - 110 mmol/L    CO2 24 21 - 32 mmol/L    Anion Gap 10 3 - 16    Glucose 168 (H) 70 - 99 mg/dL    BUN 20 7 - 20 mg/dL    Creatinine 1.0 0.9 - 1.3 mg/dL    Est, Glom Filt Rate >60 >60    Calcium 8.8 8.3 - 10.6 mg/dL   Magnesium   Result Value Ref Range    Magnesium 2.10 1.80 - 2.40 mg/dL   CBC with Auto Differential   Result Value Ref Range    WBC 8.6 4.0 - 11.0 K/uL    RBC 4.95 4.20 - 5.90 M/uL    Hemoglobin 14.2 13.5 - 17.5 g/dL    Hematocrit 41.4 40.5 - 52.5 %    MCV 83.5 80.0 - 100.0 fL    MCH 28.7 26.0 - 34.0 pg    MCHC 34.4 31.0 - 36.0 g/dL    RDW 14.1 12.4 - 15.4 %    Platelets 030 712 - 821 K/uL    MPV 8.4 5.0 - 10.5 fL    Neutrophils % 64.5 %    Lymphocytes % 24.3 %    Monocytes % 7.4 %    Eosinophils % 2.6 %    Basophils % 1.2 %    Neutrophils Absolute 5.6 1.7 - 7.7 K/uL    Lymphocytes Absolute 2.1 1.0 - 5.1 K/uL    Monocytes Absolute 0.6 0.0 - 1.3 K/uL    Eosinophils Absolute 0.2 0.0 - 0.6 K/uL    Basophils Absolute 0.1 0.0 - 0.2 K/uL   Basic Metabolic Panel w/ Reflex to MG   Result Value Ref Range    Sodium 138 136 - 145 mmol/L    Potassium reflex Magnesium 3.2 (L) 3.5 - 5.1 mmol/L    Chloride 103 99 - 110 mmol/L    CO2 24 21 - 32 mmol/L    Anion Gap 11 3 - 16    Glucose 116 (H) 70 - 99 mg/dL    BUN 27 (H) 7 - 20 mg/dL    Creatinine 1.2 0.9 - 1.3 mg/dL    Est, Glom Filt Rate >60 >60    Calcium 8.6 8.3 - 10.6 mg/dL   Magnesium   Result Value Ref Range    Magnesium 2.00 1.80 - 2.40 mg/dL   POCT Glucose   Result Value Ref Range    POC Glucose 201 (H) 70 - 99 mg/dl    Performed on ACCU-CHEK    POCT Glucose   Result Value Ref Range    POC Glucose 115 (H) 70 - 99 mg/dl    Performed on ACCU-CHEK    POCT Glucose   Result Value Ref Range    POC Glucose 113 (H) 70 - 99 mg/dl    Performed on ACCU-CHEK    POCT Glucose   Result Value Ref Range    POC Glucose 145 (H) 70 - 99 mg/dl    Performed on ACCU-CHEK    POCT Glucose   Result Value Ref Range    POC Glucose 135 (H) 70 - 99 mg/dl    Performed on ACCU-CHEK    EKG 12 Lead   Result Value Ref Range    Ventricular Rate 89 BPM    Atrial Rate 89 BPM    P-R Interval 160 ms    QRS Duration 104 ms    Q-T Interval 412 ms    QTc Calculation (Bazett) 501 ms    P Axis 42 degrees    R Axis -26 degrees    T Axis 16 degrees    Diagnosis       Normal sinus rhythmCannot rule out Anterior infarct , age undeterminedProlonged QTNonspecific ST and T wave abnormalityNon-specific intra-ventricular conduction delayWhen compared with ECG of 01-NOV-2021 15:40,No significant change was foundConfirmed by ADAM Sotelo MD ([id number]) on 12/6/2022 7:43:17 AM     EKG 12 Lead   Result Value Ref Range    Ventricular Rate 66 BPM    Atrial Rate 66 BPM    P-R Interval 162 ms    QRS Duration 96 ms    Q-T Interval 464 ms    QTc Calculation (Bazett) 486 ms    P Axis 27 degrees    R Axis -33 degrees    T Axis 75 degrees    Diagnosis       Normal sinus rhythmLeft axis deviationNonspecific T wave abnormalityProlonged QTAbnormal ECGWhen compared with ECG of 05-DEC-2022 21:25,Nonspecific T wave abnormality no longer evident in inferior leadsConfirmed by ADMA MCMILLAN MD [id number]) on 12/6/2022 6:19:35 PM        Radiographs (if obtained):  Radiologist's Report Reviewed:  CT HEAD WO CONTRAST    Result Date: 12/4/2022  EXAMINATION: CT OF THE HEAD WITHOUT CONTRAST  12/4/2022 9:50 pm TECHNIQUE: CT of the head was performed without the administration of intravenous contrast. Automated exposure control, iterative reconstruction, and/or weight based adjustment of the mA/kV was utilized to reduce the radiation dose to as low as reasonably achievable. COMPARISON: None. HISTORY: ORDERING SYSTEM PROVIDED HISTORY: LIGHTHEADEDNESS/DIZZINESS, MILD HEADACHE EARLIER TODAY TECHNOLOGIST PROVIDED HISTORY: Reason for exam:->LIGHTHEADEDNESS/DIZZINESS, MILD HEADACHE EARLIER TODAY Has a \"code stroke\" or \"stroke alert\" been called? ->No Decision Support Exception - unselect if not a suspected or confirmed emergency medical condition->Emergency Medical Condition (MA) Reason for Exam: LIGHTHEADEDNESS/DIZZINESS, MILD HEADACHE EARLIER TODAY FINDINGS: BRAIN/VENTRICLES: There is no acute intracranial hemorrhage, mass effect or midline shift. No abnormal extra-axial fluid collection. The gray-white differentiation is maintained without evidence of an acute infarct. There is no evidence of hydrocephalus. Intracranial atherosclerosis. No wedge-shaped area of acute ischemia. No basilar cistern or sulcal effacement. ORBITS: The visualized portion of the orbits demonstrate no acute abnormality. SINUSES: Mastoid air cells are well aerated. Paranasal sinuses appear well aerated. No air-fluid levels are seen. SOFT TISSUES/SKULL:  No acute abnormality of the visualized skull or soft tissues. No acute intracranial abnormality. CT CHEST PULMONARY EMBOLISM W CONTRAST    Result Date: 12/4/2022  EXAMINATION: CTA OF THE CHEST 12/4/2022 10:03 pm TECHNIQUE: CTA of the chest was performed after the administration of intravenous contrast.  Multiplanar reformatted images are provided for review. MIP images are provided for review. Automated exposure control, iterative reconstruction, and/or weight based adjustment of the mA/kV was utilized to reduce the radiation dose to as low as reasonably achievable. COMPARISON: None.  HISTORY: ORDERING SYSTEM PROVIDED HISTORY: COUGHING UP BLOOD TECHNOLOGIST PROVIDED HISTORY: Reason for exam:->COUGHING UP BLOOD Decision Support Exception - unselect if not a suspected or confirmed emergency medical condition->Emergency Medical Condition (MA) Reason for Exam: coughing up blood, dizziness, no hx of PE FINDINGS: Pulmonary Arteries: The pulmonary arteries are completely under filled. However, no central pulmonary emboli. Mediastinum: No evidence of mediastinal lymphadenopathy. The heart and pericardium demonstrate no acute abnormality. There is no acute abnormality of the thoracic aorta. Lungs/pleura: Mild ground-glass opacities in the right upper lobe, left upper lobe, right lower lobe and left lower lobe. No visualized consolidation Upper Abdomen: Limited images of the upper abdomen are unremarkable. Soft Tissues/Bones: Spondylosis     1. None study is suboptimal.  No central pulmonary emboli. 2. Probable atypical pneumonia. EKG (if obtained): (All EKG's are interpreted by myself in the absence of a cardiologist)  Normal sinus rhythm, ventricular rate, [place] interval, QRS duration are within normal limits, QTc is elevated at 503, no significant ST elevation or depression, some T wave flattening in the lateral leads    MDM:    47-year-old male present with history seen above. Vitals on presentation patient is hypertensive but otherwise vitals are reassuring and patient afebrile satting on room air. On reevaluation patient's blood pressure does improve to the 170s over 80s without intervention laboratory evaluation is overall reassuring. CT PE protocol reveals no pulmonary embolism. There is mild groundglass opacities in the right upper and lower and left upper and lower lobes. Patient's blood pressure then increases to the systolics in the 900O once again. Patient given hydralazine and labetalol. Patient will be admitted to the hospital service for further evaluation and treatment. Clinical Impression:  1. Hemoptysis    2.  Hypertensive emergency      Disposition referral (if applicable):  KIMBER Kruse - CNP  [address] [facility] [address] [facility]    Go on 1/16/2023  hospital follow up at 9:00    Disposition medications (if applicable):  Discharge Medication List as of 12/8/2022  9:23 PM        ED Provider Disposition Time  DISPOSITION Admitted 12/05/2022 02:57:23 AM      Comment: Please note this report has been produced using speech recognition software and may contain errors related to that system including errors in grammar, punctuation, and spelling, as well as words and phrases that may be inappropriate. Efforts were made to edit the dictations.         France Cardozo MD  12/10/22 [id number]

## 2022-12-12 LAB — RENIN ACTIVITY: <0.1 NG/ML/HR

## 2022-12-12 NOTE — DISCHARGE SUMMARY
Name:  Tim Lee  Room:  /4644-94  MRN:    [id number]    [facility] Discharge Summary      This discharge summary is in conjunction with a complete physical exam done on the day of discharge. Discharging Provider: Jose Guadalupe Ewing PA-C  Discharging Attending Physician: Dr. Asha Jameson: 12/4/2022  Discharge:  12/8/2022    HPI taken from admission H&P:    35 y.o. male presents to the ER following onset of chest pain followed by hemoptysis earlier this evening. Denies fever,chills productive cough, malaise, body aches, change in taste / smell, sore throat or shortness of breath. He noted onset of precordial chest pain lasting minutes occurring as he was preparing to take a nap, relieved with rest.Immediately after chest pain started, he developed a cough productive of pink foam.      Patient admits to history of non compliance with antihypertensives, diabetic meds     Diagnoses this Admission and Hospital Course   #Hemoptysis -likely result of acute pulmonary edema with blood pressure as high as 230/120  #Chest pain   -CTA chest above with no PE   -Pneumonia ruled out  -procalc neg, no WBC, afebrile  -Was on Rocephin and Zithromax this has been discontinued.  -trops 0.01 x3   -has not had any further episodes today and CP has resolved   -Seen in consultation by pulmonology and cardiology. #Hypertensive urgency   -noncompliant with home regimen   - Cardiology consult. - No stress test planned for now. - Echocardiogram shows diastolic dysfunction  -Patient initially treated with IV Lopressor and as needed hydralazine; persistent high blood pressures transferred to ICU and placed on nicardipine drip. Off nicardipine drip now  - continue to monitor BP, has been slowly down trending   - Continue metoprolol BID. Losartan 50 mg added.   - Blood pressure still running high today;  I will   -losartan increased to 100 mg daily   - monitor blood pressure and heart rate  -added nifedipine 30 BID   -would like to discontinue prn IV BP meds      #Chronic diastolic HF   -grade II   -no s/s of decompensation      #DM type 2   -holding oral metformin   -SSI   -Patient blood sugars have been stable and he is getting very anxious with blood sugar checks#. Tanda Bun We will stop them      #Bipolar disorder   -with manic behavior   -no home medication regimen  -psychiatry consulted  Patient does not want to start any new medications right now     # Hypokalemia  - replete     Patient has improved today. Continue to monitor blood pressures overnight  Likely DC plans for tomorrow on metoprolol twice daily and losartan 100 mg daily. medications can be arranged through meds to beds     12/8/22     Patient still with elevated BP readings, even up into the 200/100s  -need to wean off IV prn prior to discharge     Patient left AGAINST MEDICAL ADVICE    Procedures (Please Review Full Report for Details)  N/A    Consults    Psychiatry  Cardiology   Pulmonology       Physical Exam at Discharge:    BP (!) 173/96   Pulse 68   Temp 97.7 °F (36.5 °C) (Oral)   Resp 18   Ht 6' 1\" (1.854 m)   Wt 285 lb 11.2 oz (129.6 kg)   SpO2 95%   BMI 37.69 kg/m²   Gen: No distress. Alert. Young male   Eyes: PERRL. No sclera icterus. No conjunctival injection. Neck: No JVD. Trachea midline. Resp: No accessory muscle use. No crackles. No wheezes. No rhonchi. CV: Regular rate. Regular rhythm. No murmur. No rub. No edema. Peripheral Pulses: +2 palpable, equal bilaterally   GI: Non-tender. Non-distended. Normal bowel sounds. Skin: Warm and dry. No nodule on exposed extremities. No rash on exposed extremities. M/S: No cyanosis. No joint deformity. No clubbing. Neuro: Awake. Grossly nonfocal    Psych: Oriented x 3.  No anxiety ++agitation    Lab Results   Component Value Date    WBC 8.6 12/08/2022    HGB 14.2 12/08/2022    HCT 41.4 12/08/2022    MCV 83.5 12/08/2022     12/08/2022     Lab Results   Component Value Date     12/08/2022 K 3.2 (L) 12/08/2022     12/08/2022    CO2 24 12/08/2022    BUN 27 (H) 12/08/2022    CREATININE 1.2 12/08/2022    GLUCOSE 116 (H) 12/08/2022    CALCIUM 8.6 12/08/2022    PROT 7.1 12/04/2022    LABALBU 4.4 12/05/2022    BILITOT 0.6 12/04/2022    ALKPHOS 91 12/04/2022    AST 22 12/04/2022    ALT 17 12/04/2022    LABGLOM >60 12/08/2022    GFRAA >60 11/01/2021    AGRATIO 1.5 12/04/2022         CULTURES  COVID and Flu not detected     RADIOLOGY  CT CHEST PULMONARY EMBOLISM W CONTRAST   Final Result   1. None study is suboptimal.  No central pulmonary emboli. 2. Probable atypical pneumonia. CT HEAD WO CONTRAST   Final Result   No acute intracranial abnormality. Discharge Medications     Medication List        ASK your doctor about these medications      aspirin 81 MG chewable tablet     lisinopril-hydroCHLOROthiazide 20-25 MG per tablet  Commonly known as: PRINZIDE;ZESTORETIC  Take 1 tablet by mouth daily     metFORMIN 500 MG tablet  Commonly known as: GLUCOPHAGE  Take 1 tablet by mouth 2 times daily (with meals)                Left AMA     Follow Up:   Follow up with PCP in 1 week    Stevie Laguerre  12/12/22  11:37 AM

## 2022-12-22 NOTE — PROGRESS NOTES
Physician Progress Note      Kishan Nuno  CSN #:                  [id number]  :                       1980  ADMIT DATE:       2022 7:58 PM  [address] [place] [facility] DATE:        2022 6:20 PM  RESPONDING  PROVIDER #:        Flora Quinones          QUERY TEXT:    Pt admitted with hemoptysis d/t acute pulmonary edema. Pt noted to have   hypertensive urgency/emergency in notes, dCHF noted \"no s/s decompensation\". If possible, please document in the progress notes and discharge summary if   you are evaluating and/or treating any of the following: The medical record reflects the following:  Risk Factors: hypertension, noncompliance with home regimen  Clinical Indicators: per DCS hemoptysis \"likely result of acute pulmonary   edema\"    CT: Lungs/pleura: Mild ground-glass opacities in the right upper lobe, left   upper  lobe, right lower lobe and left lower lobe. ? No visualized consolidation      Treatment: Cardene drip and ICU monitoring, imaging, cardiology and pulm   consults, supportive care    Thank you,  [email or web address]  Options provided:  -- Noncardiogenic acute pulmonary edema due to hypertensive emergency  -- Acute pulmonary edema due to heart failure  -- Other - I will add my own diagnosis  -- Disagree - Not applicable / Not valid  -- Disagree - Clinically unable to determine / Unknown  -- Refer to Clinical Documentation Reviewer    PROVIDER RESPONSE TEXT:    This patient had noncardiogenic acute pulmonary edema due to hypertensive   emergency.     Query created by: Haroon Qureshi on 2022 6:10 AM      Electronically signed by:  Flora Quinones 2022 8:35 AM

## 2023-02-27 ENCOUNTER — APPOINTMENT (OUTPATIENT)
Dept: GENERAL RADIOLOGY | Age: 43
DRG: 241 | End: 2023-02-27
Payer: MEDICAID

## 2023-02-27 ENCOUNTER — APPOINTMENT (OUTPATIENT)
Dept: CT IMAGING | Age: 43
DRG: 241 | End: 2023-02-27
Payer: MEDICAID

## 2023-02-27 ENCOUNTER — HOSPITAL ENCOUNTER (INPATIENT)
Age: 43
LOS: 1 days | Discharge: HOME | DRG: 241 | End: 2023-02-28
Attending: STUDENT IN AN ORGANIZED HEALTH CARE EDUCATION/TRAINING PROGRAM | Admitting: INTERNAL MEDICINE
Payer: MEDICAID

## 2023-02-27 DIAGNOSIS — K92.1 GASTROINTESTINAL HEMORRHAGE WITH MELENA: Primary | ICD-10-CM

## 2023-02-27 DIAGNOSIS — D62 ANEMIA DUE TO ACUTE BLOOD LOSS: ICD-10-CM

## 2023-02-27 PROBLEM — K92.2 GI BLEED: Status: ACTIVE | Noted: 2023-02-27

## 2023-02-27 PROBLEM — E11.9 TYPE 2 DIABETES MELLITUS WITHOUT COMPLICATION (HCC): Status: ACTIVE | Noted: 2023-02-27

## 2023-02-27 PROBLEM — I10 HYPERTENSION: Status: ACTIVE | Noted: 2023-02-27

## 2023-02-27 LAB
A/G RATIO: 2.4 (ref 1.1–2.2)
ABO/RH: NORMAL
ALBUMIN SERPL-MCNC: 3.9 G/DL (ref 3.4–5)
ALP BLD-CCNC: 54 U/L (ref 40–129)
ALT SERPL-CCNC: 9 U/L (ref 10–40)
AMPHETAMINE SCREEN, URINE: NORMAL
ANION GAP SERPL CALCULATED.3IONS-SCNC: 9 MMOL/L (ref 3–16)
ANTIBODY SCREEN: NORMAL
AST SERPL-CCNC: 11 U/L (ref 15–37)
BARBITURATE SCREEN URINE: NORMAL
BASOPHILS ABSOLUTE: 0.1 K/UL (ref 0–0.2)
BASOPHILS RELATIVE PERCENT: 1.1 %
BENZODIAZEPINE SCREEN, URINE: NORMAL
BILIRUB SERPL-MCNC: 0.3 MG/DL (ref 0–1)
BILIRUBIN URINE: NEGATIVE
BLOOD BANK DISPENSE STATUS: NORMAL
BLOOD BANK DISPENSE STATUS: NORMAL
BLOOD BANK PRODUCT CODE: NORMAL
BLOOD BANK PRODUCT CODE: NORMAL
BLOOD, URINE: NEGATIVE
BPU ID: NORMAL
BPU ID: NORMAL
BUN BLDV-MCNC: 33 MG/DL (ref 7–20)
CALCIUM SERPL-MCNC: 8.7 MG/DL (ref 8.3–10.6)
CANNABINOID SCREEN URINE: NORMAL
CHLORIDE BLD-SCNC: 105 MMOL/L (ref 99–110)
CLARITY: CLEAR
CO2: 27 MMOL/L (ref 21–32)
COCAINE METABOLITE SCREEN URINE: NORMAL
COLOR: YELLOW
CREAT SERPL-MCNC: 1 MG/DL (ref 0.9–1.3)
DESCRIPTION BLOOD BANK: NORMAL
DESCRIPTION BLOOD BANK: NORMAL
EKG ATRIAL RATE: 92 BPM
EKG DIAGNOSIS: NORMAL
EKG P AXIS: 14 DEGREES
EKG P-R INTERVAL: 160 MS
EKG Q-T INTERVAL: 402 MS
EKG QRS DURATION: 98 MS
EKG QTC CALCULATION (BAZETT): 497 MS
EKG R AXIS: -15 DEGREES
EKG T AXIS: 156 DEGREES
EKG VENTRICULAR RATE: 92 BPM
EOSINOPHILS ABSOLUTE: 0.2 K/UL (ref 0–0.6)
EOSINOPHILS RELATIVE PERCENT: 2.1 %
ETHANOL: NORMAL MG/DL (ref 0–0.08)
FENTANYL SCREEN, URINE: NORMAL
GFR SERPL CREATININE-BSD FRML MDRD: >60 ML/MIN/{1.73_M2}
GLUCOSE BLD-MCNC: 149 MG/DL (ref 70–99)
GLUCOSE BLD-MCNC: 155 MG/DL (ref 70–99)
GLUCOSE BLD-MCNC: 167 MG/DL (ref 70–99)
GLUCOSE URINE: NEGATIVE MG/DL
HCT VFR BLD CALC: 16.4 % (ref 40.5–52.5)
HCT VFR BLD CALC: 16.7 % (ref 40.5–52.5)
HEMOGLOBIN: 5.8 G/DL (ref 13.5–17.5)
HEMOGLOBIN: 5.8 G/DL (ref 13.5–17.5)
INFLUENZA A: NOT DETECTED
INFLUENZA B: NOT DETECTED
INR BLD: 1.13 (ref 0.87–1.14)
KETONES, URINE: NEGATIVE MG/DL
LEUKOCYTE ESTERASE, URINE: NEGATIVE
LIPASE: 55 U/L (ref 13–60)
LYMPHOCYTES ABSOLUTE: 2.1 K/UL (ref 1–5.1)
LYMPHOCYTES RELATIVE PERCENT: 23.1 %
Lab: NORMAL
MAGNESIUM: 1.9 MG/DL (ref 1.8–2.4)
MCH RBC QN AUTO: 29.6 PG (ref 26–34)
MCHC RBC AUTO-ENTMCNC: 35.4 G/DL (ref 31–36)
MCV RBC AUTO: 83.8 FL (ref 80–100)
METHADONE SCREEN, URINE: NORMAL
MICROSCOPIC EXAMINATION: NORMAL
MONOCYTES ABSOLUTE: 0.6 K/UL (ref 0–1.3)
MONOCYTES RELATIVE PERCENT: 6.2 %
NEUTROPHILS ABSOLUTE: 6 K/UL (ref 1.7–7.7)
NEUTROPHILS RELATIVE PERCENT: 67.5 %
NITRITE, URINE: NEGATIVE
OCCULT BLOOD DIAGNOSTIC: ABNORMAL
OPIATE SCREEN URINE: NORMAL
OXYCODONE URINE: NORMAL
PDW BLD-RTO: 14.6 % (ref 12.4–15.4)
PERFORMED ON: ABNORMAL
PERFORMED ON: ABNORMAL
PH UA: 5.5
PH UA: 5.5 (ref 5–8)
PHENCYCLIDINE SCREEN URINE: NORMAL
PLATELET # BLD: 243 K/UL (ref 135–450)
PMV BLD AUTO: 8.8 FL (ref 5–10.5)
POTASSIUM REFLEX MAGNESIUM: 3.2 MMOL/L (ref 3.5–5.1)
PROTEIN UA: NEGATIVE MG/DL
PROTHROMBIN TIME: 14.3 SEC (ref 11.7–14.5)
RBC # BLD: 1.96 M/UL (ref 4.2–5.9)
SARS-COV-2 RNA, RT PCR: NOT DETECTED
SODIUM BLD-SCNC: 141 MMOL/L (ref 136–145)
SPECIFIC GRAVITY UA: 1.01 (ref 1–1.03)
TOTAL PROTEIN: 5.5 G/DL (ref 6.4–8.2)
TROPONIN: <0.01 NG/ML
TSH SERPL DL<=0.05 MIU/L-ACNC: 1.51 UIU/ML (ref 0.27–4.2)
URINE REFLEX TO CULTURE: NORMAL
URINE TYPE: NORMAL
UROBILINOGEN, URINE: 0.2 E.U./DL
WBC # BLD: 8.9 K/UL (ref 4–11)

## 2023-02-27 PROCEDURE — 99222 1ST HOSP IP/OBS MODERATE 55: CPT

## 2023-02-27 PROCEDURE — 96374 THER/PROPH/DIAG INJ IV PUSH: CPT

## 2023-02-27 PROCEDURE — C9113 INJ PANTOPRAZOLE SODIUM, VIA: HCPCS | Performed by: STUDENT IN AN ORGANIZED HEALTH CARE EDUCATION/TRAINING PROGRAM

## 2023-02-27 PROCEDURE — 82077 ASSAY SPEC XCP UR&BREATH IA: CPT

## 2023-02-27 PROCEDURE — 80307 DRUG TEST PRSMV CHEM ANLYZR: CPT

## 2023-02-27 PROCEDURE — 86923 COMPATIBILITY TEST ELECTRIC: CPT

## 2023-02-27 PROCEDURE — 36415 COLL VENOUS BLD VENIPUNCTURE: CPT

## 2023-02-27 PROCEDURE — P9016 RBC LEUKOCYTES REDUCED: HCPCS

## 2023-02-27 PROCEDURE — 99285 EMERGENCY DEPT VISIT HI MDM: CPT

## 2023-02-27 PROCEDURE — 6360000002 HC RX W HCPCS: Performed by: INTERNAL MEDICINE

## 2023-02-27 PROCEDURE — 83036 HEMOGLOBIN GLYCOSYLATED A1C: CPT

## 2023-02-27 PROCEDURE — 86850 RBC ANTIBODY SCREEN: CPT

## 2023-02-27 PROCEDURE — 87636 SARSCOV2 & INF A&B AMP PRB: CPT

## 2023-02-27 PROCEDURE — 70450 CT HEAD/BRAIN W/O DYE: CPT

## 2023-02-27 PROCEDURE — 74177 CT ABD & PELVIS W/CONTRAST: CPT

## 2023-02-27 PROCEDURE — 85610 PROTHROMBIN TIME: CPT

## 2023-02-27 PROCEDURE — C9113 INJ PANTOPRAZOLE SODIUM, VIA: HCPCS | Performed by: INTERNAL MEDICINE

## 2023-02-27 PROCEDURE — 71045 X-RAY EXAM CHEST 1 VIEW: CPT

## 2023-02-27 PROCEDURE — 93005 ELECTROCARDIOGRAM TRACING: CPT | Performed by: STUDENT IN AN ORGANIZED HEALTH CARE EDUCATION/TRAINING PROGRAM

## 2023-02-27 PROCEDURE — 2709999900 HC NON-CHARGEABLE SUPPLY: Performed by: INTERNAL MEDICINE

## 2023-02-27 PROCEDURE — 7100000010 HC PHASE II RECOVERY - FIRST 15 MIN: Performed by: INTERNAL MEDICINE

## 2023-02-27 PROCEDURE — 99152 MOD SED SAME PHYS/QHP 5/>YRS: CPT | Performed by: INTERNAL MEDICINE

## 2023-02-27 PROCEDURE — 82270 OCCULT BLOOD FECES: CPT

## 2023-02-27 PROCEDURE — 3609017100 HC EGD: Performed by: INTERNAL MEDICINE

## 2023-02-27 PROCEDURE — 85025 COMPLETE CBC W/AUTO DIFF WBC: CPT

## 2023-02-27 PROCEDURE — 81003 URINALYSIS AUTO W/O SCOPE: CPT

## 2023-02-27 PROCEDURE — 86901 BLOOD TYPING SEROLOGIC RH(D): CPT

## 2023-02-27 PROCEDURE — 2580000003 HC RX 258: Performed by: INTERNAL MEDICINE

## 2023-02-27 PROCEDURE — 86900 BLOOD TYPING SEROLOGIC ABO: CPT

## 2023-02-27 PROCEDURE — 85014 HEMATOCRIT: CPT

## 2023-02-27 PROCEDURE — 83690 ASSAY OF LIPASE: CPT

## 2023-02-27 PROCEDURE — 6360000002 HC RX W HCPCS: Performed by: STUDENT IN AN ORGANIZED HEALTH CARE EDUCATION/TRAINING PROGRAM

## 2023-02-27 PROCEDURE — 83735 ASSAY OF MAGNESIUM: CPT

## 2023-02-27 PROCEDURE — 84484 ASSAY OF TROPONIN QUANT: CPT

## 2023-02-27 PROCEDURE — 80053 COMPREHEN METABOLIC PANEL: CPT

## 2023-02-27 PROCEDURE — 6360000004 HC RX CONTRAST MEDICATION: Performed by: STUDENT IN AN ORGANIZED HEALTH CARE EDUCATION/TRAINING PROGRAM

## 2023-02-27 PROCEDURE — 36430 TRANSFUSION BLD/BLD COMPNT: CPT

## 2023-02-27 PROCEDURE — 1200000000 HC SEMI PRIVATE

## 2023-02-27 PROCEDURE — 84443 ASSAY THYROID STIM HORMONE: CPT

## 2023-02-27 PROCEDURE — 85018 HEMOGLOBIN: CPT

## 2023-02-27 RX ORDER — INSULIN LISPRO 100 [IU]/ML
0-4 INJECTION, SOLUTION INTRAVENOUS; SUBCUTANEOUS EVERY 4 HOURS
Status: DISCONTINUED | OUTPATIENT
Start: 2023-02-27 | End: 2023-02-28 | Stop reason: HOSPADM

## 2023-02-27 RX ORDER — SODIUM CHLORIDE 9 MG/ML
INJECTION, SOLUTION INTRAVENOUS PRN
Status: DISCONTINUED | OUTPATIENT
Start: 2023-02-27 | End: 2023-02-28 | Stop reason: HOSPADM

## 2023-02-27 RX ORDER — FENTANYL CITRATE 50 UG/ML
INJECTION, SOLUTION INTRAMUSCULAR; INTRAVENOUS PRN
Status: DISCONTINUED | OUTPATIENT
Start: 2023-02-27 | End: 2023-02-27 | Stop reason: ALTCHOICE

## 2023-02-27 RX ORDER — SODIUM CHLORIDE 0.9 % (FLUSH) 0.9 %
5-40 SYRINGE (ML) INJECTION EVERY 12 HOURS SCHEDULED
Status: DISCONTINUED | OUTPATIENT
Start: 2023-02-27 | End: 2023-02-28 | Stop reason: HOSPADM

## 2023-02-27 RX ORDER — PANTOPRAZOLE SODIUM 40 MG/10ML
80 INJECTION, POWDER, LYOPHILIZED, FOR SOLUTION INTRAVENOUS ONCE
Status: COMPLETED | OUTPATIENT
Start: 2023-02-27 | End: 2023-02-27

## 2023-02-27 RX ORDER — MIDAZOLAM HYDROCHLORIDE 5 MG/ML
INJECTION INTRAMUSCULAR; INTRAVENOUS PRN
Status: DISCONTINUED | OUTPATIENT
Start: 2023-02-27 | End: 2023-02-27 | Stop reason: ALTCHOICE

## 2023-02-27 RX ORDER — DEXTROSE MONOHYDRATE 100 MG/ML
INJECTION, SOLUTION INTRAVENOUS CONTINUOUS PRN
Status: DISCONTINUED | OUTPATIENT
Start: 2023-02-27 | End: 2023-02-28 | Stop reason: HOSPADM

## 2023-02-27 RX ORDER — ACETAMINOPHEN 650 MG/1
650 SUPPOSITORY RECTAL EVERY 6 HOURS PRN
Status: DISCONTINUED | OUTPATIENT
Start: 2023-02-27 | End: 2023-02-28 | Stop reason: HOSPADM

## 2023-02-27 RX ORDER — SODIUM CHLORIDE 0.9 % (FLUSH) 0.9 %
5-40 SYRINGE (ML) INJECTION PRN
Status: DISCONTINUED | OUTPATIENT
Start: 2023-02-27 | End: 2023-02-28 | Stop reason: HOSPADM

## 2023-02-27 RX ORDER — ONDANSETRON 4 MG/1
4 TABLET, ORALLY DISINTEGRATING ORAL EVERY 8 HOURS PRN
Status: DISCONTINUED | OUTPATIENT
Start: 2023-02-27 | End: 2023-02-28 | Stop reason: HOSPADM

## 2023-02-27 RX ORDER — ACETAMINOPHEN 325 MG/1
650 TABLET ORAL EVERY 6 HOURS PRN
Status: DISCONTINUED | OUTPATIENT
Start: 2023-02-27 | End: 2023-02-28 | Stop reason: HOSPADM

## 2023-02-27 RX ORDER — ONDANSETRON 2 MG/ML
4 INJECTION INTRAMUSCULAR; INTRAVENOUS EVERY 6 HOURS PRN
Status: DISCONTINUED | OUTPATIENT
Start: 2023-02-27 | End: 2023-02-28 | Stop reason: HOSPADM

## 2023-02-27 RX ORDER — SODIUM CHLORIDE 9 MG/ML
INJECTION, SOLUTION INTRAVENOUS CONTINUOUS
Status: DISCONTINUED | OUTPATIENT
Start: 2023-02-27 | End: 2023-02-28 | Stop reason: HOSPADM

## 2023-02-27 RX ADMIN — Medication 10 ML: at 11:33

## 2023-02-27 RX ADMIN — SODIUM CHLORIDE 8 MG/HR: 9 INJECTION, SOLUTION INTRAVENOUS at 20:18

## 2023-02-27 RX ADMIN — SODIUM CHLORIDE: 9 INJECTION, SOLUTION INTRAVENOUS at 20:15

## 2023-02-27 RX ADMIN — IOPAMIDOL 75 ML: 755 INJECTION, SOLUTION INTRAVENOUS at 07:36

## 2023-02-27 RX ADMIN — SODIUM CHLORIDE 8 MG/HR: 9 INJECTION, SOLUTION INTRAVENOUS at 11:29

## 2023-02-27 RX ADMIN — SODIUM CHLORIDE: 9 INJECTION, SOLUTION INTRAVENOUS at 11:32

## 2023-02-27 RX ADMIN — PANTOPRAZOLE SODIUM 80 MG: 40 INJECTION, POWDER, FOR SOLUTION INTRAVENOUS at 06:57

## 2023-02-27 ASSESSMENT — PAIN - FUNCTIONAL ASSESSMENT
PAIN_FUNCTIONAL_ASSESSMENT: NONE - DENIES PAIN
PAIN_FUNCTIONAL_ASSESSMENT: NONE - DENIES PAIN

## 2023-02-27 ASSESSMENT — LIFESTYLE VARIABLES
HOW OFTEN DO YOU HAVE A DRINK CONTAINING ALCOHOL: MONTHLY OR LESS
HOW MANY STANDARD DRINKS CONTAINING ALCOHOL DO YOU HAVE ON A TYPICAL DAY: 1 OR 2

## 2023-02-27 NOTE — PROGRESS NOTES
Report called to Saman Thompson RN. Pt is still waiting bed assignment so pt taken back to ER room 9 in stable condition.

## 2023-02-27 NOTE — ED NOTES
Per Dr Milvia Ghosh with [facility], [facility] for patient to eat      Kristina Stapleton RN  02/27/23 7527

## 2023-02-27 NOTE — ED PROVIDER NOTES
[address] ED      CHIEF COMPLAINT  Other (Pt. Ate [employer] 8 days ago and reports it \"kicked him in the butt. \" Pt. States he hasnt been \"leveled out\" since. Reported black stool, dizziness and family at bedside reports erratic behavior.)       HISTORY OF PRESENT ILLNESS  Mykel Maurer is a 38 y.o. male  who presents to the ED complaining of generalized weakness, confusion, lightheadedness and dark stools for the last 5 days. Patient states that this all seem to occur after he ate [address] 5 days ago. He states that he has just been feeling off since then. Has had chills, no fevers. Denies any nausea or vomiting. States his stools are dark, black in color, but they are still formed. Denies any dysuria or hematuria. Denies any chest pain or shortness of breath. No other complaints, modifying factors or associated symptoms. I have reviewed the following from the nursing documentation. Past Medical History:   Diagnosis Date    ADHD     Bipolar 1 disorder ([facility]     Diabetes mellitus ([facility] [facility]     Hypertension      Past Surgical History:   Procedure Laterality Date    ANKLE SURGERY Left      History reviewed. No pertinent family history.   Social History     Socioeconomic History    Marital status:      Spouse name: Not on file    Number of children: Not on file    Years of education: Not on file    Highest education level: Not on file   Occupational History    Not on file   Tobacco Use    Smoking status: Never    Smokeless tobacco: Never   Substance and Sexual Activity    Alcohol use: Not Currently     Comment: rarely    Drug use: Never    Sexual activity: Not on file   Other Topics Concern    Not on file   Social History Narrative    Not on file     Social Determinants of Health     Financial Resource Strain: Not on file   Food Insecurity: Not on file   Transportation Needs: Not on file   Physical Activity: Not on file   Stress: Not on file   Social Connections: Not on file Intimate Partner Violence: Not on file   Housing Stability: Not on file     No current facility-administered medications for this encounter. Current Outpatient Medications   Medication Sig Dispense Refill    aspirin 81 MG chewable tablet Take 81 mg by mouth daily (Patient not taking: Reported on 12/4/2022)      lisinopril-hydroCHLOROthiazide (PRINZIDE;ZESTORETIC) 20-25 MG per tablet Take 1 tablet by mouth daily (Patient not taking: No sig reported) 30 tablet 0    metFORMIN (GLUCOPHAGE) 500 MG tablet Take 1 tablet by mouth 2 times daily (with meals) (Patient not taking: Reported on 12/4/2022) 60 tablet 0     Allergies   Allergen Reactions    Lisinopril-Hydrochlorothiazide Other (See Comments)     \"Rapid heart rate\"      Other Other (See Comments)     Unknown blood pressure pill that caused his heart rate to raise. REVIEW OF SYSTEMS  10 systems reviewed, pertinent positives per HPI otherwise noted to be negative. PHYSICAL EXAM  /78   Pulse 94   Temp 97 °F (36.1 °C) (Axillary)   Resp 26   Ht 6' 1\" (1.854 m)   Wt 275 lb (124.7 kg)   SpO2 100%   BMI 36.28 kg/m²    General: Appears well. Alert  HEENT: Head atraumatic, Eyes normal inspection, PERRL. Normal ENT inspection, Pharynx normal. No signs of dehydration  NECK: Normal inspection  RESPIRATORY: Normal breath sounds. No chest wall tenderness. No respiratory distress  CVS: Heart rate and rhythm regular. No Murmurs  ABDOMEN/GI: Soft, Non-tender, No distention  BACK: Normal inspection  EXTREMITIES: Non-Tender. Full ROM. Normal appearance. No Pedal edema  NEURO: Alert and oriented. Sensation normal. Motor normal  PSYCH: Mood normal. Affect normal.  SKIN: Color normal. No rash. Warm, Dry    LABS  I have reviewed all labs for this visit.    Results for orders placed or performed during the hospital encounter of 02/27/23   COVID-19 & Influenza Combo    Specimen: Nasopharyngeal Swab   Result Value Ref Range    SARS-CoV-2 RNA, RT PCR NOT DETECTED NOT DETECTED    INFLUENZA A NOT DETECTED NOT DETECTED    INFLUENZA B NOT DETECTED NOT DETECTED   CBC with Auto Differential   Result Value Ref Range    WBC 8.9 4.0 - 11.0 K/uL    RBC 1.96 (L) 4.20 - 5.90 M/uL    Hemoglobin 5.8 (LL) 13.5 - 17.5 g/dL    Hematocrit 16.4 (LL) 40.5 - 52.5 %    MCV 83.8 80.0 - 100.0 fL    MCH 29.6 26.0 - 34.0 pg    MCHC 35.4 31.0 - 36.0 g/dL    RDW 14.6 12.4 - 15.4 %    Platelets 197 538 - 818 K/uL    MPV 8.8 5.0 - 10.5 fL    Neutrophils % 67.5 %    Lymphocytes % 23.1 %    Monocytes % 6.2 %    Eosinophils % 2.1 %    Basophils % 1.1 %    Neutrophils Absolute 6.0 1.7 - 7.7 K/uL    Lymphocytes Absolute 2.1 1.0 - 5.1 K/uL    Monocytes Absolute 0.6 0.0 - 1.3 K/uL    Eosinophils Absolute 0.2 0.0 - 0.6 K/uL    Basophils Absolute 0.1 0.0 - 0.2 K/uL   CMP w/ Reflex to MG   Result Value Ref Range    Sodium 141 136 - 145 mmol/L    Potassium reflex Magnesium 3.2 (L) 3.5 - 5.1 mmol/L    Chloride 105 99 - 110 mmol/L    CO2 27 21 - 32 mmol/L    Anion Gap 9 3 - 16    Glucose 167 (H) 70 - 99 mg/dL    BUN 33 (H) 7 - 20 mg/dL    Creatinine 1.0 0.9 - 1.3 mg/dL    Est, Glom Filt Rate >60 >60    Calcium 8.7 8.3 - 10.6 mg/dL    Total Protein 5.5 (L) 6.4 - 8.2 g/dL    Albumin 3.9 3.4 - 5.0 g/dL    Albumin/Globulin Ratio 2.4 (H) 1.1 - 2.2    Total Bilirubin 0.3 0.0 - 1.0 mg/dL    Alkaline Phosphatase 54 40 - 129 U/L    ALT 9 (L) 10 - 40 U/L    AST 11 (L) 15 - 37 U/L   ETOH   Result Value Ref Range    Ethanol Lvl None Detected mg/dL   Troponin   Result Value Ref Range    Troponin <0.01 <0.01 ng/mL   Lipase   Result Value Ref Range    Lipase 55.0 13.0 - 60.0 U/L   Blood occult stool #1   Result Value Ref Range    Occult Blood Diagnostic Result: POSITIVE  Normal range: Negative   (A)    Magnesium   Result Value Ref Range    Magnesium 1.90 1.80 - 2.40 mg/dL   EKG 12 Lead   Result Value Ref Range    Ventricular Rate 92 BPM    Atrial Rate 92 BPM    P-R Interval 160 ms    QRS Duration 98 ms    Q-T Interval 402 ms    QTc Calculation (Bazett) 497 ms    P Axis 14 degrees    R Axis -15 degrees    T Axis 156 degrees    Diagnosis       Normal sinus rhythmT wave abnormality, consider lateral ischemiaProlonged QTAbnormal ECGWhen compared with ECG of 06-DEC-2022 15:05,T wave inversion more evident in Lateral leadsConfirmed by Cooper Neil MD, [address] (1986) on 2/27/2023 7:13:25 AM       ECG  The Ekg interpreted by me shows  Sinus rhythm with a rate of 92 bpm.  Left axis deviation. T wave inversions in lead I, aVL, V4 through V6. No ST elevations or depressions. , QRS 98, QTc 497. T wave inversions are new compared to EKG on 12/6/2022    RADIOLOGY  XR CHEST PORTABLE   Preliminary Result   1. Cardiomegaly without failure. CT Head W/O Contrast    (Results Pending)   CT ABDOMEN PELVIS W IV CONTRAST Additional Contrast? None    (Results Pending)     ED COURSE/MDM  Patient seen and evaluated. Old records reviewed. Labs and imaging reviewed and results discussed with patient. DIFFERENTIAL DX  Dehydration, GI bleed, intoxication, influenza, COVID, sepsis    Due to the complaints of intermittent confusion and generalized weakness, broad altered mental status work-up obtained. Patient is having black stools and hemoglobin is noted to be decreasing in the fives. Stool is black and tarry on exam.  Given 80 mg Protonix IV. Consented for blood and will be transfused 2 units. GI consult placed. Due to altered mental status, will obtain CT head and with his abdominal tenderness, will also obtain CT abdomen pelvis. At the time of signout CT head and abdomen pelvis are pending. Plan for admission for upper GI bleed in addition to any other pathology noted on scans. Critical care    Critical care time 32 minutes exclusive from separate billable procedures that were performed.  The following was considered in the determination of critical care but not limited to the level of medical decision making, intensive cardiac and/or respiratory monitoring, frequent vital sign monitoring, evaluation of laboratory studies, evaluation of radiographic studies, oxygen monitoring, and constant monitoring and speaking to family at bedside. Is this patient to be included in the SEP-1 Core Measure due to severe sepsis or septic shock? No   Exclusion criteria - the patient is NOT to be included for SEP-1 Core Measure due to: Infection is not suspected    During the patient's ED course, the patient was given:  Medications   0.9 % sodium chloride infusion (has no administration in time range)   pantoprazole (PROTONIX) injection 80 mg (80 mg IntraVENous Given 2/27/23 0657)        IOlayinka DO,  am the primary clinician of record. CLINICAL IMPRESSION  1. Gastrointestinal hemorrhage with melena    2. Anemia due to acute blood loss        Blood pressure 133/78, pulse 94, temperature 97 °F (36.1 °C), temperature source Axillary, resp. rate 26, height 6' 1\" (1.854 m), weight 275 lb (124.7 kg), SpO2 100 %. DISPOSITION  [employer] was signed out to oncoming ED physician in stable condition. Patient was given scripts for the following medications. I counseled patient how to take these medications. New Prescriptions    No medications on file       Follow-up with:  No follow-up provider specified. DISCLAIMER: This chart was created using Dragon dictation software. Efforts were made by me to ensure accuracy, however some errors may be present due to limitations of this technology and occasionally words are not transcribed correctly.         Olayinka Junior DO  02/27/23 8876

## 2023-02-27 NOTE — BRIEF OP NOTE
Brief Postoperative Note      Patient: iLnda Montalvo  YOB: 1980  MRN: [id number]    Date of Procedure: 2/27/2023    Pre-Op Diagnosis: GI BLEED    Post-Op Diagnosis:  gastric erosion and small 5mm clean based duodenal ulcer, without bleeding or high risk stigmata       Procedure(s):  EGD IV SEDATION    Surgeon(s):  Alo Mcallister MD    Assistant:  * No surgical staff found *    Anesthesia: IV Sedation    Estimated Blood Loss (mL): Minimal    Complications: None    Specimens:   * No specimens in log *    Implants:  * No implants in log *      Drains: * No LDAs found *    Findings: gastric erosion and small 5mm clean based duodenal ulcer, without bleeding or high risk stigmata    Recommendation  Continue supportive care  PPI BID x 8wks  Monitor Hgb  Check stool H pylori ag  Observe for signs of bleeding  Advance diet as tolerated  Avoid NSAIDs  Possible d/c tomorrow    Electronically signed by Alo Mcallister MD on 2/27/2023 at 6:28 PM

## 2023-02-27 NOTE — ED NOTES
4699-Call placed to [address] [place] [address] for consult placed by Dr. Jeoy Cruz. Miriam Robert  02/27/23 5547 [id number]-Call back received from Dr. Roger Yee spoke with Dr. Jacob Gardner regarding consult.       Miriam Robert  02/27/23 8355

## 2023-02-27 NOTE — PLAN OF CARE
Gi beed, melena x 5 days with dizziness  Hb of 5.8  PRBC transfusion ordered  GI consulted  BP stable

## 2023-02-27 NOTE — ED NOTES
Endo nurses are here to take patient over for his procedure.       Adelaida Cam, KAIA  02/27/23 5688

## 2023-02-27 NOTE — ED NOTES
0700 -- report from [facility] for continuity of care.       Abdirizak Tristan RN  02/27/23 2443

## 2023-02-27 NOTE — ED NOTES
Per Dr Deborah Garcia to consult the GI doctor with whether or not patient can eat. Patient is to get another unit of blood per Dr Deborah Garcia, she will order.       Ginger Costa RN  02/27/23 7975

## 2023-02-27 NOTE — ED NOTES
Received update on patient from ENDO. Patient had gastric erosions and duodenal ulcer. They gave 5 of verseed and 100mcg of fentanyl. Patient is resting in bed. Placed back on monitor.       Levi Banks RN  02/27/23 3534

## 2023-02-27 NOTE — ED NOTES
Perfected served the hospitalist to inform of patients results.        Perico Leung RN  02/27/23 3623

## 2023-02-27 NOTE — ED PROVIDER NOTES
10:17 AM: I discussed the history, physical examination, laboratory and imaging studies, and treatment plan with Dr. Ellamae Bloch. Gareth Patience was signed out to me in stable condition. Please see Dr. Nash Camacho documentation for details of their history, physical, and laboratory studies. Upon re-examination, a summary of Judah Luis's history, physical examination, and studies are as follows:      Patient presented with fatigue, malaise, and intermittent confusion. Patient is also had black stool and had positive stool occult. Hemoglobin was 5.8. Patient received 2 units of packed RBCs. At the time of signout, pending with CT head and CTA of the abdomen pelvis as well as discussion with GI. Patient is not on blood thinners and denies heavy alcohol use. ED Course as of 02/27/23 1017   Mon Feb 27, 2023 0727 7:28 AM  Spoke to Dr. Luther Mendez of gastroenterology, they will see the patient while he is in the hospital. [ER]   0633 CT Abd/Pelvis: IMPRESSION:  No acute intra-process. Splenomegaly. [ER]   [id number] CT Head: IMPRESSION:  No evidence of acute intracranial abnormality. [ER]      ED Course User Index  [ER] Boom León MD     Patient admitted.      Boom León MD  02/27/23 1017

## 2023-02-27 NOTE — ED NOTES
Endo called, the procedure is at 1513.  Patient updated      Maria Guadalupe Barnett RN  02/27/23 0542

## 2023-02-27 NOTE — H&P
History and Physical / Pre-Sedation Assessment    Patient:  Christine Galo   :   1980     Intended Procedure:  EGD    HPI: 38year old male with history of HTN, DM, Bipolar do, ADHD and chronic NSAID use admitted with symptomatic anemia and melena    Past Medical History:   Diagnosis Date    ADHD     Bipolar 1 disorder ([facility] [address]     [facility]     Hypertension      Past Surgical History:   Procedure Laterality Date    ANKLE SURGERY Left        Medications reviewed  Prior to Admission medications    Medication Sig Start Date End Date Taking? Authorizing Provider   aspirin 81 MG chewable tablet Take 81 mg by mouth daily  Patient not taking: Reported on 2022    Historical Provider, MD   lisinopril-hydroCHLOROthiazide (PRINZIDE;ZESTORETIC) 20-25 MG per tablet Take 1 tablet by mouth daily  Patient not taking: No sig reported 21   Kiki Mcburney, APRN - CNP   metFORMIN (GLUCOPHAGE) 500 MG tablet Take 1 tablet by mouth 2 times daily (with meals)  Patient not taking: Reported on 2022   Kiki Mcburney, APRN - CNP        Allergies: Allergies   Allergen Reactions    Lisinopril-Hydrochlorothiazide Other (See Comments)     \"Rapid heart rate\"      Other Other (See Comments)     Unknown blood pressure pill that caused his heart rate to raise. Nurses notes reviewed and agreed. Physical Exam:  Vital Signs: BP (!) 171/88   Pulse 83   Temp 99.1 °F (37.3 °C) (Temporal)   Resp 16   Ht 6' 1\" (1.854 m)   Wt 275 lb (124.7 kg)   SpO2 100%   BMI 36.28 kg/m²    Airway: Mallampati: II (soft palate, uvula, fauces visible)  Pulmonary:Normal  Cardiac:Normal  Abdomen:Normal    Pre-Procedure Assessment / Plan:  ASA: Class 3 - A patient with severe systemic disease that limits activity but is not incapacitating  Level of Sedation Plan: Moderate sedation  Post Procedure plan: Return to same level of care    I assessed the patient and find that the patient is in satisfactory condition to proceed with the planned procedure and sedation plan. I have explained the risk, benefits, and alternatives to the procedure; the patient understands and agrees to proceed.        Michelle Brownlee MD  2/27/2023

## 2023-02-28 VITALS
WEIGHT: 277 LBS | TEMPERATURE: 98.8 F | HEART RATE: 86 BPM | HEIGHT: 73 IN | BODY MASS INDEX: 36.71 KG/M2 | DIASTOLIC BLOOD PRESSURE: 81 MMHG | OXYGEN SATURATION: 97 % | SYSTOLIC BLOOD PRESSURE: 171 MMHG | RESPIRATION RATE: 16 BRPM

## 2023-02-28 LAB
APTT: <18 SEC (ref 23–34.3)
ESTIMATED AVERAGE GLUCOSE: 99.7 MG/DL
GLUCOSE BLD-MCNC: 134 MG/DL (ref 70–99)
GLUCOSE BLD-MCNC: 134 MG/DL (ref 70–99)
GLUCOSE BLD-MCNC: 141 MG/DL (ref 70–99)
GLUCOSE BLD-MCNC: 159 MG/DL (ref 70–99)
HBA1C MFR BLD: 5.1 %
HCT VFR BLD CALC: 20.3 % (ref 40.5–52.5)
HCT VFR BLD CALC: 21.2 % (ref 40.5–52.5)
HCT VFR BLD CALC: 21.7 % (ref 40.5–52.5)
HCT VFR BLD CALC: 21.8 % (ref 40.5–52.5)
HEMOGLOBIN: 7.1 G/DL (ref 13.5–17.5)
HEMOGLOBIN: 7.2 G/DL (ref 13.5–17.5)
HEMOGLOBIN: 7.2 G/DL (ref 13.5–17.5)
HEMOGLOBIN: 7.4 G/DL (ref 13.5–17.5)
INR BLD: 0.91 (ref 0.87–1.14)
IRON SATURATION: 16 % (ref 20–50)
IRON: 50 UG/DL (ref 59–158)
PERFORMED ON: ABNORMAL
PROTHROMBIN TIME: 12.1 SEC (ref 11.7–14.5)
TOTAL IRON BINDING CAPACITY: 304 UG/DL (ref 260–445)

## 2023-02-28 PROCEDURE — 83540 ASSAY OF IRON: CPT

## 2023-02-28 PROCEDURE — 2580000003 HC RX 258: Performed by: INTERNAL MEDICINE

## 2023-02-28 PROCEDURE — 85018 HEMOGLOBIN: CPT

## 2023-02-28 PROCEDURE — 6370000000 HC RX 637 (ALT 250 FOR IP): Performed by: INTERNAL MEDICINE

## 2023-02-28 PROCEDURE — 6360000002 HC RX W HCPCS: Performed by: INTERNAL MEDICINE

## 2023-02-28 PROCEDURE — 85610 PROTHROMBIN TIME: CPT

## 2023-02-28 PROCEDURE — C9113 INJ PANTOPRAZOLE SODIUM, VIA: HCPCS | Performed by: INTERNAL MEDICINE

## 2023-02-28 PROCEDURE — 0DJ08ZZ INSPECTION OF UPPER INTESTINAL TRACT, VIA NATURAL OR ARTIFICIAL OPENING ENDOSCOPIC: ICD-10-PCS | Performed by: INTERNAL MEDICINE

## 2023-02-28 PROCEDURE — 85014 HEMATOCRIT: CPT

## 2023-02-28 PROCEDURE — 87338 HPYLORI STOOL AG IA: CPT

## 2023-02-28 PROCEDURE — 83550 IRON BINDING TEST: CPT

## 2023-02-28 PROCEDURE — 85730 THROMBOPLASTIN TIME PARTIAL: CPT

## 2023-02-28 PROCEDURE — 36415 COLL VENOUS BLD VENIPUNCTURE: CPT

## 2023-02-28 RX ORDER — AMLODIPINE BESYLATE 5 MG/1
10 TABLET ORAL DAILY
Status: DISCONTINUED | OUTPATIENT
Start: 2023-02-28 | End: 2023-02-28 | Stop reason: HOSPADM

## 2023-02-28 RX ORDER — PANTOPRAZOLE SODIUM 40 MG/1
40 TABLET, DELAYED RELEASE ORAL
Qty: 60 TABLET | Refills: 1 | Status: SHIPPED | OUTPATIENT
Start: 2023-02-28

## 2023-02-28 RX ORDER — AMLODIPINE BESYLATE 10 MG/1
10 TABLET ORAL DAILY
Qty: 30 TABLET | Refills: 1 | Status: SHIPPED | OUTPATIENT
Start: 2023-02-28

## 2023-02-28 RX ADMIN — AMLODIPINE BESYLATE 10 MG: 5 TABLET ORAL at 15:52

## 2023-02-28 RX ADMIN — IRON SUCROSE 200 MG: 20 INJECTION, SOLUTION INTRAVENOUS at 13:52

## 2023-02-28 RX ADMIN — SODIUM CHLORIDE: 9 INJECTION, SOLUTION INTRAVENOUS at 05:17

## 2023-02-28 RX ADMIN — SODIUM CHLORIDE 8 MG/HR: 9 INJECTION, SOLUTION INTRAVENOUS at 05:15

## 2023-02-28 NOTE — DISCHARGE INSTR - DIET

## 2023-02-28 NOTE — PROGRESS NOTES
Patient admitted to room 205 from ED. Alert and oriented, with ongoing blood transfusion. Side rails up x2. Patient has an order for telemetry. Bed is locked and in lowest position. Call light placed in patient reach. Patient explained the routine of the hospital, including but not limited to lab work, vital signs, hourly rounding, etc. Care plans and education updated. CHG wipes will be done by patient himself. BP (!) 160/84   Pulse 82   Temp 98.9 °F (37.2 °C) (Oral)   Resp 16   Ht 6' 1\" (1.854 m)   Wt 277 lb (125.6 kg)   SpO2 99%   BMI 36.55 kg/m²     Most recent set of vitals as shown. Patient has no LDA that  require CHG wipes, including possible a surgery incision, mac catheter, or a central line. 4 Eyes Skin Assessment     The patient is being assess for   Admission    I agree that 2 RN's have performed a thorough Head to Toe Skin Assessment on the patient. ALL assessment sites listed below have been assessed. Areas assessed for pressure by both nurses:   [x]   Head, Face, and Ears   [x]   Shoulders, Back, and Chest, Abdomen  [x]   Arms, Elbows, and Hands   [x]   Coccyx, Sacrum, and Ischium  [x]   Legs, Feet, and Heels        Skin Assessed Under all Medical Devices by both nurses:  None               All Mepilex Borders were peeled back and area peeked at by both nurses:  No: None  Please list where Mepilex Borders are located:  NA             **SHARE this note so that the co-signing nurse is able to place an eSignature**    Co-signer eSignature: Electronically signed by Nina Salinas RN on 2/28/23 at 1:51 AM EST    Does the Patient have Skin Breakdown related to pressure?   No     (Insert Photo here NA)         Tj Prevention initiated:  NA   Wound Care Orders initiated:  NA      WOC nurse consulted for Pressure Injury (Stage 3,4, Unstageable, DTI, NWPT, Complex wounds)and New or Established Ostomies:  NA      Primary Nurse eSignature: Electronically signed by Colleen Rahman RN on 2/27/23 at 11:26 PM EST      Bedside Mobility Assessment Tool (BMAT):     Assessment Level 1- Sit and Shake    1. From a semi-reclined position, ask patient to sit up and rotate to a seated position at the side of the bed. Can use the bedrail. 2. Ask patient to reach out and grab your hand and shake making sure patient reaches across his/her midline. Pass- Patient is able to come to a seated position, maintain core strength. Maintains seated balance while reaching across midline. Move on to Assessment Level 2. Assessment Level 2- Stretch and Point   1. With patient in seated position at the side of the bed, have patient place both feet on the floor (or stool) with knees no higher than hips. 2. Ask patient to stretch one leg and straighten the knee, then bend the ankle/flex and point the toes. If appropriate, repeat with the other leg. Pass- Patient is able to demonstrate appropriate quad strength on intended weight bearing limb(s). Move onto Assessment Level 3. Assessment Level 3- Stand   1. Ask patient to elevate off the bed or chair (seated to standing) using an assistive device (cane, bedrail). 2. Patient should be able to raise buttocks off be and hold for a count of five. May repeat once. Pass- Patient maintains standing stability for at least 5 seconds, proceed to assessment level 4. Assessment Level 4- Walk   1. Ask patient to march in place at bedside. 2. Then ask patient to advance step and return each foot. Some medical conditions may render a patient from stepping backwards, use your best clinical judgement. Pass- Patient demonstrates balance while shifting weight and ability to step, takes independent steps, does not use assistive device patient is MOBILITY LEVEL 4. Mobility Level- 4    Patient is able to demonstrate the ability to move from a reclining position to an upright position within the recliner.

## 2023-02-28 NOTE — PROGRESS NOTES
Shift assessment complete. B/p elevated, patient has no difficulty urinating and having bowel movements. Patient informed for the need of a stool sample. Patient has no complaints at this time. Patient denies any needs. Will continue to montior.

## 2023-02-28 NOTE — PROGRESS NOTES
Transferred care to [facility]. Face to face bedside report given, no need voiced at this time.

## 2023-02-28 NOTE — DISCHARGE INSTR - COC
Continuity of Care Form    Patient Name: Bhavik Cho   :  1980  MRN:  [id number]    Admit date:  2023  Discharge date:  ***    Code Status Order: Full Code   Advance Directives:   Advance Care Flowsheet Documentation       Date/Time Healthcare Directive Type of Healthcare Directive Copy in [address] Agent's Name Healthcare Agent's Phone Number    23 1427 No, patient does not have an advance directive for healthcare treatment -- -- -- -- --            Admitting Physician:  Barbie Wynne MD  PCP: No primary care provider on file. Discharging Nurse: [facility] Unit/Room#: 0205/0205-02  Discharging Unit Phone Number: ***    Emergency Contact:   Extended Emergency Contact Information  Primary Emergency Contact: dimitri David Phone: [phone]  Work Phone: [phone]  Mobile Phone: [phone]  Relation: Other   needed? No    Past Surgical History:  Past Surgical History:   Procedure Laterality Date    ANKLE SURGERY Left     UPPER GASTROINTESTINAL ENDOSCOPY N/A 2023    EGD IV SEDATION performed by Alondra Meyer MD at [address] [place] [address]       Immunization History: There is no immunization history on file for this patient.     Active Problems:  Patient Active Problem List   Diagnosis Code    Hemoptysis R04.2    Hypertensive emergency I16.1    Acute pulmonary edema (HCC) J81.0    Chest pain R07.9    Bipolar I disorder, most recent episode depressed, mild ([facility] [address] F31.31    GI bleed K92.2    Anemia due to acute blood loss D62    Hypertension I10    Type 2 diabetes mellitus without complication (HCC) G47.0       Isolation/Infection:   Isolation            No Isolation          Patient Infection Status       Infection Onset Added Last Indicated Last Indicated By Review Planned Expiration Resolved Resolved By    None active    Resolved    COVID-19 (Rule Out) 23 COVID-19 & Influenza Combo (Ordered)   02/27/23 Rule-Out Test Resulted    COVID-19 (Rule Out) 12/04/22 12/04/22 12/04/22 COVID-19 & Influenza Combo (Ordered)   12/04/22 Rule-Out Test Resulted            Nurse Assessment:  Last Vital Signs: BP (!) 171/81   Pulse 86   Temp 98.8 °F (37.1 °C) (Oral)   Resp 16   Ht 6' 1\" (1.854 m)   Wt 277 lb (125.6 kg)   SpO2 97%   BMI 36.55 kg/m²     Last documented pain score (0-10 scale):    Last Weight:   Wt Readings from Last 1 Encounters:   02/27/23 277 lb (125.6 kg)     Mental Status:  {IP PT MENTAL STATUS:20030}    IV Access:  { LACI IV ACCESS:085983196}    Nursing Mobility/ADLs:  Walking   {[facility] DME PGCC:640596247}  Transfer  {[facility] DME AQBA:068562946}  Bathing  {[facility] DME ETQJ:576473463}  Dressing  {[facility] DME IZHY:136061301}  Toileting  {[facility] DME NKKL:015921655}  Feeding  {[facility] DME RWEV:046722814}  Med Admin  {[facility] DME VUYU:598140520}  Med Delivery   { LACI MED Delivery:118170459}    Wound Care Documentation and Therapy:        Elimination:  Continence: Bowel: {YES / JS:52066}  Bladder: {YES / GK:02708}  Urinary Catheter: {Urinary Catheter:924578146}   Colostomy/Ileostomy/Ileal Conduit: {YES / TM:42628}       Date of Last BM: ***    Intake/Output Summary (Last 24 hours) at 2/28/2023 1504  Last data filed at 2/28/2023 0904  Gross per 24 hour   Intake 673.33 ml   Output 800 ml   Net -126.67 ml     I/O last 3 completed shifts:   In: 1333.3 [Blood:1333.3]  Out: 800 [Urine:800]    Safety Concerns:     [address] Safety Concerns:421720338}    Impairments/Disabilities:      [address] Impairments/Disabilities:864727195}    Nutrition Therapy:  Current Nutrition Therapy:   [address] Diet List:303813599}    Routes of Feeding: {[facility]P DME Other Feedings:463349234}  Liquids: {Slp liquid thickness:39319}  Daily Fluid Restriction: {CHP DME Yes amt example:484838294}  Last Modified Barium Swallow with Video (Video Swallowing Test): {Done Not Done AGRS:772049897}    Treatments at the Time of Hospital Discharge: Respiratory Treatments: ***  Oxygen Therapy:  {Therapy; copd oxygen:05495}  Ventilator:    {MH CC Vent TXEY:242316184}    Rehab Therapies: {THERAPEUTIC INTERVENTION:2762957542}  Weight Bearing Status/Restrictions: [address] ELLIOTT Weight Bearin}  Other Medical Equipment (for information only, NOT a DME order):  {EQUIPMENT:427272108}  Other Treatments: ***    Patient's personal belongings (please select all that are sent with patient):  {[facility] DME Belongings:253089645}    RN SIGNATURE:  {Esignature:747273193}    CASE MANAGEMENT/SOCIAL WORK SECTION    Inpatient Status Date: ***    Readmission Risk Assessment Score:  Readmission Risk              Risk of Unplanned Readmission:  12           Discharging to Facility/ Agency   Name:   Address:  Phone:  Fax:    Dialysis Facility (if applicable)   Name:  Address:  Dialysis Schedule:  Phone:  Fax:    / signature: {Esignature:663823443}    PHYSICIAN SECTION    Prognosis: {Prognosis:6325419825}    Condition at Discharge: [address] Patient Condition:226540590}    Rehab Potential (if transferring to Rehab): {Prognosis:5922733620}    Recommended Labs or Other Treatments After Discharge: ***    Physician Certification: I certify the above information and transfer of Jan Alegre  is necessary for the continuing treatment of the diagnosis listed and that he requires {Admit to Appropriate Level of Care:57052} for {GREATER/LESS:692367607} 30 days.      Update Admission H&P: {P DME Changes in DOMRV:390120588}    PHYSICIAN SIGNATURE:  {Esignature:074310915}

## 2023-02-28 NOTE — ACP (ADVANCE CARE PLANNING)
Advance Care Planning     General Advance Care Planning (ACP) Conversation    Date of Conversation: 2/27/2023  Conducted with: Patient with Decision Making Capacity    Healthcare Decision Maker:    Primary Decision Maker: Derrick Jg [facility] - [phone]  Click here to complete [address] including selection of the Healthcare Decision Maker Relationship (ie \"Primary\"). Today we  discussed that without ACP documents that legal next of Kin would be primary contact for decision making.     Content/Action Overview:  Has NO ACP documents/care preferences - requested patient complete ACP documents  Reviewed DNR/DNI and patient elects Full Code (Attempt Resuscitation)        Length of Voluntary ACP Conversation in minutes:  <16 minutes (Non-Billable)    Jaclyn Farris RN

## 2023-02-28 NOTE — PROGRESS NOTES
Patient seemed to be asleep as manifested by eyes closed. No need voiced at this time. Call light within reach.

## 2023-02-28 NOTE — PROGRESS NOTES
IM Progress Note    Admit Date:  2/27/2023    Subjective:  Mr. Candice Rogers ***    Objective:   BP (!) 171/81   Pulse 86   Temp 98.8 °F (37.1 °C) (Oral)   Resp 16   Ht 6' 1\" (1.854 m)   Wt 277 lb (125.6 kg)   SpO2 97%   BMI 36.55 kg/m²     Intake/Output Summary (Last 24 hours) at 2/28/2023 1432  Last data filed at 2/28/2023 0034  Gross per 24 hour   Intake 673.33 ml   Output 800 ml   Net -126.67 ml         Physical Exam:  General:  Awake, alert, NAD  Skin:  Warm and dry  Neck:  JVD absent. Neck supple  Chest:  Clear to auscultation, respiration easy. No wheezes, rales or rhonchi. Cardiovascular:  RRR ,S1S2 normal  Abdomen:  Soft, non tender, non distended, BS +  Extremities:  No edema. Intact peripheral pulses. Brisk cap refill, < 2 secs  Neuro: non focal      Medications:   Scheduled Meds:   sodium chloride flush  5-40 mL IntraVENous 2 times per day    insulin lispro  0-4 Units SubCUTAneous Q4H       Continuous Infusions:   sodium chloride      sodium chloride      pantoprozole (PROTONIX) infusion 8 mg/hr (02/28/23 0515)    sodium chloride 100 mL/hr at 02/28/23 0517    dextrose      sodium chloride         Data:  CBC:   Recent Labs     02/27/23  0615 02/27/23  1708 02/28/23  0004 02/28/23  0342 02/28/23  0838   WBC 8.9  --   --   --   --    RBC 1.96*  --   --   --   --    HGB 5.8*   < > 7.1* 7.2* 7.2*   HCT 16.4*   < > 20.3* 21.7* 21.2*   MCV 83.8  --   --   --   --    RDW 14.6  --   --   --   --      --   --   --   --     < > = values in this interval not displayed. BMP:   Recent Labs     02/27/23 0615      K 3.2*      CO2 27   BUN 33*   CREATININE 1.0     BNP: No results for input(s): BNP in the last 72 hours.   PT/INR:   Recent Labs     02/27/23 0615 02/28/23  0342   PROTIME 14.3 12.1   INR 1.13 0.91     APTT:   Recent Labs     02/28/23 0342   APTT <18.0*     CARDIAC ENZYMES:   Recent Labs     02/27/23 0615   TROPONINI <0.01     FASTING LIPID PANEL:  Lab Results   Component Value Date    CHOL 183 12/05/2022    HDL 29 (L) 12/05/2022    TRIG 157 (H) 12/05/2022     LIVER PROFILE:   Recent Labs     02/27/23  0615   AST 11*   ALT 9*   BILITOT 0.3   ALKPHOS 54          Cultures  ***    Radiology  CT ABDOMEN PELVIS W IV CONTRAST Additional Contrast? None   Final Result   No acute intra-process. Splenomegaly. CT Head W/O Contrast   Final Result   No evidence of acute intracranial abnormality. XR CHEST PORTABLE   Final Result   1. Cardiomegaly without failure. Assessment:  Principal Problem:    GI bleed  Active Problems:    Anemia due to acute blood loss    Hypertension    Type 2 diabetes mellitus without complication (HCC)  Resolved Problems:    * No resolved hospital problems. *      Plan:  ***      S/p endoscopy done which shows duodenal ulcer. .  No active bleeding. And gastric erosions. Likely NSAID induced  Patient has been advised to avoid all NSAIDs he can be discharged on PPI twice daily. Can follow-up with GI as an. .  Patient given a dose of IV iron prior to discharge. Hemoglobin improved to 7.2 today s/p 2 units of PRBC transfusion. Hypertension  Patient had not been taking any of his medications as he did not have insurance  Allergy to lisinopril listed.   We will start him on Norvasc and discharged on the same he needs close follow-up with his PCP    Zarina Raymond MD   2/28/2023 2:32 PM

## 2023-02-28 NOTE — PROGRESS NOTES
Patient appears to be sleeping as manifested by eyes closed. Respiration easy. No need or concern voiced at this time.

## 2023-02-28 NOTE — CARE COORDINATION
Case Management Assessment  Initial Evaluation    Date/Time of Evaluation: 2/28/2023 3:53 PM  Assessment Completed by: Sy Stringer RN    If patient is discharged prior to next notation, then this note serves as note for discharge by case management. Patient Name: Bhavik Cho                   YOB: 1980  Diagnosis: Anemia due to acute blood loss [D62]  GI bleed [K92.2]  Gastrointestinal hemorrhage with melena [K92.1]                   Date / Time: 2/27/2023  5:43 AM    Patient Admission Status: Inpatient   Readmission Risk (Low < 19, Mod (19-27), High > 27): Readmission Risk Score: 15.4    Current PCP: No primary care provider on file. PCP verified by CM? Yes (No PCP)    Chart Reviewed: Yes      History Provided by: Patient  Patient Orientation: Alert and Oriented    Patient Cognition: Alert    Hospitalization in the last 30 days (Readmission):  No    If yes, Readmission Assessment in CM Navigator will be completed. Advance Directives:      Code Status: Full Code   Patient's Primary Decision Maker is: Patient Declined (Legal Next of Kin Remains as Decision Maker)    Primary Decision Maker: Jona Carreon - [phone]    Discharge Planning:    Patient lives with: Friends Type of Home: House  Primary Care Giver: Self  Patient Support Systems include: None   Current Financial resources: None  Current community resources: None  Current services prior to admission: None            Current DME:              Type of Home Care services:  None    ADLS  Prior functional level: Independent in ADLs/IADLs  Current functional level: Independent in ADLs/IADLs    PT AM-PAC:   /24  OT AM-PAC:   /24    Family can provide assistance at DC: Yes  Would you like Case Management to discuss the discharge plan with any other family members/significant others, and if so, who?  No  Plans to Return to Present Housing: Yes  Other Identified Issues/Barriers to RETURNING to current housing: None  Potential Assistance needed at discharge: N/A            Potential DME:    Patient expects to discharge to: [facility] for transportation at discharge:      Financial    Payor: MEDICAID [place] / Plan: [address] [facility] DEPT OF JOB / Product Type: *No Product type* /     Does insurance require precert for SNF: No    Potential assistance Purchasing Medications: No  Meds-to-Beds request: Yes    No Pharmacies Listed    Notes:    Factors facilitating achievement of predicted outcomes: Friend support, Motivated, and Cooperative    Barriers to discharge:  None    Additional Case Management Notes:  Reviewed  chart and order for dc noted. Spoke with pt who cont plan to return home with friends. States is fully IPTA. Denies any needs for dc. No dc needs identified. The Plan for Transition of Care is related to the following treatment goals of Anemia due to acute blood loss [D62]  GI bleed [K92.2]  Gastrointestinal hemorrhage with melena [C89.9]    IF APPLICABLE: The Patient and/or patient representative Lidya Dick and his family were provided with a choice of provider and agrees with the discharge plan. Freedom of choice list with basic dialogue that supports the patient's individualized plan of care/goals and shares the quality data associated with the providers was provided to:     Patient Representative Name:       The Patient and/or Patient Representative Agree with the Discharge Plan?       Sharron Siegel RN  Case Management Department  Ph: [phone] Fax: [phone]

## 2023-02-28 NOTE — PROGRESS NOTES
1145 vital signs deleted from patient chart. Vital sign machine was still registered to patient and new patients vital signs were recording into this chart.

## 2023-02-28 NOTE — PROGRESS NOTES
Received a panic result for hematocrit post transfusion result- 20.3. Latest hb 7.1. Notified Dr. Melissa Shepard via perfect served.

## 2023-02-28 NOTE — PROGRESS NOTES
PROGRESS NOTE  S:42 yrs Patient  admitted on 2/27/2023 with Anemia due to acute blood loss [D62]  GI bleed [K92.2]  Gastrointestinal hemorrhage with melena [K92.1] . Today he has no signs of bleeding. He denies abdominal pain     Exam:   Vitals:    02/28/23 0926   BP: (!) 167/91   Pulse: 84   Resp: 16   Temp: 98.9 °F (37.2 °C)   SpO2: 99%     Generalized: appears stated age, no acute distress  HEENT: sclera clear, anicteric  Neck: supple, trachea midline  Abdomen: soft, NT, ND  Heart: NSR  Extremities: no edema        Medications: Reviewed    Labs:  CBC:   Recent Labs     02/27/23  0615 02/27/23  1708 02/28/23  0004 02/28/23  0342 02/28/23  0838   WBC 8.9  --   --   --   --    HGB 5.8*   < > 7.1* 7.2* 7.2*   HCT 16.4*   < > 20.3* 21.7* 21.2*   MCV 83.8  --   --   --   --      --   --   --   --     < > = values in this interval not displayed. BMP:   Recent Labs     02/27/23  0615      K 3.2*      CO2 27   BUN 33*   CREATININE 1.0     LIVER PROFILE:   Recent Labs     02/27/23  0615   AST 11*   ALT 9*   LIPASE 55.0   PROT 5.5*   BILITOT 0.3   ALKPHOS 54     PT/INR:   Recent Labs     02/27/23  0615 02/28/23 0342   INR 1.13 0.91     Impression:  38 y.o. male with pmx of HTN, DM, bipolar, chronic NSAID use admitted with anemia and melena. Plan:  Continue supportive care  PPI BID x 8wks  Monitor Hgb  await stool H pylori ag  IV iron infusion x 1  Observe for signs of bleeding  Advance diet as tolerated  Avoid NSAIDs  OK to d.c from GI standpoint  Repeat CBC in 4wks.     Jayde Jensen MD, MD  10:02 AM 2/28/2023

## 2023-02-28 NOTE — PROGRESS NOTES
Patient discharged to home in stable condition. Medication filled by meds to beds and delivered to patient. Patient stated understanding for discharge instructions reviewed.

## 2023-03-01 NOTE — OP NOTE
[address]                 [address] [facility]                                OPERATIVE REPORT    PATIENT NAME: Jigna Alegre                      :        1980  MED REC NO:   [id number]                          ROOM:       0205  ACCOUNT NO:   [de-identified]                           ADMIT DATE: 2023  PROVIDER:     Terese Kumar MD    DATE OF PROCEDURE:  2023    PREPROCEDURE DIAGNOSES:  1. Hematemesis. 2.  Melena. 3.  Severe symptomatic anemia. SURGEON:  Terese Kumar MD    PROCEDURE INDICATIONS:  This is a 40-year-old male with history of  diabetes, hypertension, bipolar disorder, ADHD, morbid obesity, who  presented with complaints of generalized weakness, confusion, and  lightheadedness. He also describes dark, melenic stool for the past 5  days. He developed worsening symptoms over the past 5 days, prompting a  visit to the emergency room. He denies any nausea, vomiting,  hematemesis or bright red blood per rectum. He has never had such  symptoms in the past.  He does admit to chronic NSAID use in the form of  Advil and Aleve for pain and aches. He has never had a colonoscopy. LABORATORY DATA:  White blood cell count 8.9, hemoglobin 5.8, hematocrit  16.4, platelets 149. Sodium 141, potassium 3.2, chloride 105, bicarb  27, BUN 33, creatinine 1.0, glucose 167. Liver profile with alk phos of  54, AST 11, ALT 9, total protein 5.5, albumin 3.9, lipase 55. IMAGING:  CT scan of the abdomen and pelvis,  1. No acute intra-abdominal process. 2.  Splenomegaly. MEDICATIONS:  Versed 5 mg and fentanyl 100 mcg. PROCEDURE DETAILS:  Informed consent obtained after discussing risks,  benefits, and alternatives. A full history and physical was performed. The patient was classified as ASA class III. Medications were given  sequentially, given to achieve adequate sedation.   The cardiopulmonary  status was continuously monitored throughout the procedure. The patient  was placed in left lateral decubitus position. Once adequately sedated,  a standard upper gastroscope was inserted into mouth and advanced under  direct visualization to the second portion of the duodenum. The entire  mucosa of esophagus, stomach (retroflexion and forward views), duodenum  (bulb, sweep and second portion) were examined carefully during  withdrawal.  The patient tolerated the procedure well without any  difficulties. FINDINGS:    ESOPHAGUS:  The entire esophagus appeared normal.  There was no evidence  of inflammation, ulcers, strictures or Ochoa's. STOMACH:  There was a small erosion in the gastric antrum without any  active bleeding or high-risk stigmata. Retroflexed view of the stomach  was normal.    DUODENUM:  There was a single 5-mm deep clean-based ulcer without any  active bleeding or high-risk stigmata. Second portion of the duodenum  appeared normal.    SUMMARY:  1. Single small 5-mm clean-based deep cratered duodenal bulb ulcer. 2.  Gastric erosion. 3.  No active bleeding. RECOMMENDATIONS:  1. Return the patient to floor for continuous medical care. 2.  Monitor hemoglobin. 3.  Observe for signs of bleeding. 4.  Continue PPI b.i.d. for 8 weeks. 5.  Check stool for H. pylori antigen. 6.  Avoid NSAIDs. 7.  Resume diet and advance as tolerated. 8.  Okay to discharge tomorrow if hemoglobin remains stable after dose  of IV iron.         Yehuda Brewer MD    D: 03/01/2023 9:20:44       T: 03/01/2023 9:23:54     [email or web address]  Job#: [id number]     Doc#: [id number]    CC:  Shannon Mesa MD       Primary Care Physician

## 2023-03-02 LAB — H PYLORI ANTIGEN STOOL: NEGATIVE

## 2023-03-03 LAB
BLOOD BANK DISPENSE STATUS: NORMAL
BLOOD BANK PRODUCT CODE: NORMAL
BPU ID: NORMAL
DESCRIPTION BLOOD BANK: NORMAL

## (undated) DEVICE — CONMED SCOPE SAVER BITE BLOCK, 20X27 MM: Brand: SCOPE SAVER

## (undated) DEVICE — ENDOSCOPIC KIT 2 12 FT OP4 DE2 GWN SYR

## (undated) DEVICE — YANKAUER,BULB TIP,W/O VENT,RIGID,STERILE: Brand: MEDLINE

## (undated) DEVICE — CANNULA,OXY,ADULT,SUPERSOFT,W/7'TUB,SC: Brand: MEDLINE INDUSTRIES, INC.